# Patient Record
Sex: FEMALE | Race: ASIAN | NOT HISPANIC OR LATINO | Employment: OTHER | URBAN - METROPOLITAN AREA
[De-identification: names, ages, dates, MRNs, and addresses within clinical notes are randomized per-mention and may not be internally consistent; named-entity substitution may affect disease eponyms.]

---

## 2021-05-24 ENCOUNTER — RECORDS - HEALTHEAST (OUTPATIENT)
Dept: ADMINISTRATIVE | Facility: CLINIC | Age: 76
End: 2021-05-24

## 2022-06-25 ENCOUNTER — HOSPITAL ENCOUNTER (EMERGENCY)
Facility: CLINIC | Age: 77
Discharge: HOME | End: 2022-06-25
Attending: EMERGENCY MEDICINE | Admitting: EMERGENCY MEDICINE
Payer: COMMERCIAL

## 2022-06-25 VITALS
DIASTOLIC BLOOD PRESSURE: 71 MMHG | BODY MASS INDEX: 23.22 KG/M2 | HEIGHT: 61 IN | OXYGEN SATURATION: 100 % | WEIGHT: 123 LBS | RESPIRATION RATE: 16 BRPM | HEART RATE: 74 BPM | TEMPERATURE: 97.2 F | SYSTOLIC BLOOD PRESSURE: 164 MMHG

## 2022-06-25 DIAGNOSIS — R42 LIGHTHEADEDNESS: ICD-10-CM

## 2022-06-25 LAB
ANION GAP SERPL CALCULATED.3IONS-SCNC: 3 MMOL/L (ref 3–14)
ATRIAL RATE - MUSE: 68 BPM
BASOPHILS # BLD AUTO: 0 10E3/UL (ref 0–0.2)
BASOPHILS NFR BLD AUTO: 1 %
BUN SERPL-MCNC: 15 MG/DL (ref 7–30)
CALCIUM SERPL-MCNC: 9.3 MG/DL (ref 8.5–10.1)
CHLORIDE BLD-SCNC: 101 MMOL/L (ref 94–109)
CO2 SERPL-SCNC: 28 MMOL/L (ref 20–32)
CREAT SERPL-MCNC: 0.7 MG/DL (ref 0.52–1.04)
DIASTOLIC BLOOD PRESSURE - MUSE: NORMAL MMHG
EOSINOPHIL # BLD AUTO: 0 10E3/UL (ref 0–0.7)
EOSINOPHIL NFR BLD AUTO: 1 %
ERYTHROCYTE [DISTWIDTH] IN BLOOD BY AUTOMATED COUNT: 12.3 % (ref 10–15)
GFR SERPL CREATININE-BSD FRML MDRD: 89 ML/MIN/1.73M2
GLUCOSE BLD-MCNC: 159 MG/DL (ref 70–99)
HCT VFR BLD AUTO: 37.9 % (ref 35–47)
HGB BLD-MCNC: 12.9 G/DL (ref 11.7–15.7)
HOLD SPECIMEN: NORMAL
IMM GRANULOCYTES # BLD: 0 10E3/UL
IMM GRANULOCYTES NFR BLD: 1 %
INTERPRETATION ECG - MUSE: NORMAL
LYMPHOCYTES # BLD AUTO: 0.8 10E3/UL (ref 0.8–5.3)
LYMPHOCYTES NFR BLD AUTO: 19 %
MCH RBC QN AUTO: 30.9 PG (ref 26.5–33)
MCHC RBC AUTO-ENTMCNC: 34 G/DL (ref 31.5–36.5)
MCV RBC AUTO: 91 FL (ref 78–100)
MONOCYTES # BLD AUTO: 0.3 10E3/UL (ref 0–1.3)
MONOCYTES NFR BLD AUTO: 8 %
NEUTROPHILS # BLD AUTO: 3 10E3/UL (ref 1.6–8.3)
NEUTROPHILS NFR BLD AUTO: 70 %
NRBC # BLD AUTO: 0 10E3/UL
NRBC BLD AUTO-RTO: 0 /100
P AXIS - MUSE: 42 DEGREES
PLATELET # BLD AUTO: 240 10E3/UL (ref 150–450)
POTASSIUM BLD-SCNC: 4.4 MMOL/L (ref 3.4–5.3)
PR INTERVAL - MUSE: 194 MS
QRS DURATION - MUSE: 80 MS
QT - MUSE: 398 MS
QTC - MUSE: 423 MS
R AXIS - MUSE: 12 DEGREES
RBC # BLD AUTO: 4.18 10E6/UL (ref 3.8–5.2)
SODIUM SERPL-SCNC: 132 MMOL/L (ref 133–144)
SYSTOLIC BLOOD PRESSURE - MUSE: NORMAL MMHG
T AXIS - MUSE: 40 DEGREES
TROPONIN I SERPL HS-MCNC: 4 NG/L
TSH SERPL DL<=0.005 MIU/L-ACNC: 1.84 MU/L (ref 0.4–4)
VENTRICULAR RATE- MUSE: 68 BPM
WBC # BLD AUTO: 4.2 10E3/UL (ref 4–11)

## 2022-06-25 PROCEDURE — 99284 EMERGENCY DEPT VISIT MOD MDM: CPT | Mod: 25

## 2022-06-25 PROCEDURE — 258N000003 HC RX IP 258 OP 636: Performed by: EMERGENCY MEDICINE

## 2022-06-25 PROCEDURE — 96360 HYDRATION IV INFUSION INIT: CPT

## 2022-06-25 PROCEDURE — 80048 BASIC METABOLIC PNL TOTAL CA: CPT | Performed by: EMERGENCY MEDICINE

## 2022-06-25 PROCEDURE — 36415 COLL VENOUS BLD VENIPUNCTURE: CPT | Performed by: EMERGENCY MEDICINE

## 2022-06-25 PROCEDURE — 85025 COMPLETE CBC W/AUTO DIFF WBC: CPT | Performed by: EMERGENCY MEDICINE

## 2022-06-25 PROCEDURE — 96361 HYDRATE IV INFUSION ADD-ON: CPT

## 2022-06-25 PROCEDURE — 84443 ASSAY THYROID STIM HORMONE: CPT | Performed by: EMERGENCY MEDICINE

## 2022-06-25 PROCEDURE — 84484 ASSAY OF TROPONIN QUANT: CPT | Performed by: EMERGENCY MEDICINE

## 2022-06-25 PROCEDURE — 93005 ELECTROCARDIOGRAM TRACING: CPT

## 2022-06-25 RX ORDER — CARVEDILOL 3.12 MG/1
3.12 TABLET ORAL 2 TIMES DAILY WITH MEALS
COMMUNITY

## 2022-06-25 RX ORDER — TAMOXIFEN CITRATE 10 MG/1
10 TABLET ORAL DAILY
COMMUNITY

## 2022-06-25 RX ADMIN — SODIUM CHLORIDE 1000 ML: 9 INJECTION, SOLUTION INTRAVENOUS at 12:41

## 2022-06-25 ASSESSMENT — ENCOUNTER SYMPTOMS
VOMITING: 0
SHORTNESS OF BREATH: 0
HEADACHES: 0
DIZZINESS: 1
ABDOMINAL PAIN: 1
WEAKNESS: 1

## 2022-06-25 NOTE — ED TRIAGE NOTES
C/o abdominal pain and dizziness. Pt was out for a walk and when she came back she felt dizzy like the room was spinning. Did experience some blurred vision which has since resolved.      Triage Assessment     Row Name 06/25/22 1025       Triage Assessment (Adult)    Airway WDL WDL       Respiratory WDL    Respiratory WDL WDL       Skin Circulation/Temperature WDL    Skin Circulation/Temperature WDL WDL       Cardiac WDL    Cardiac WDL WDL       Peripheral/Neurovascular WDL    Peripheral Neurovascular WDL WDL       Cognitive/Neuro/Behavioral WDL    Cognitive/Neuro/Behavioral WDL WDL

## 2022-06-25 NOTE — ED PROVIDER NOTES
"  History   Chief Complaint:  Abdominal Pain and Dizziness       HPI   Kristina Khan is a 77 year old female who presents with weakness, dizziness, and abdominal pain since 0900 while taking a walk with several episodes of loss of vision over the course of 10 minutes. She felt close to passing out. Her vision is no longer blacking out here at the emergency department but she still feels dizziness and abdominal pain. She denies vomiting, chest pain, shortness of breath, and headaches. She eat prior to going on her walk but has not been drinking much water.     Review of Systems   Respiratory: Negative for shortness of breath.    Cardiovascular: Negative for chest pain.   Gastrointestinal: Positive for abdominal pain. Negative for vomiting.   Neurological: Positive for dizziness and weakness. Negative for headaches.   All other systems reviewed and are negative.      Allergies:  No known drug allergies     Medications:  Coreg     Past Medical History:     Hyperlipidemia   Benign tumor of adrenal gland  Breast cancer  Allergic rhinitis     Past Surgical History:    Adrenalectomy  Appendectomy    Family History:    Father: lung cancer  Mother: heart disease  Sister: breast cancer    Social History:  The patient presents to the ED with son    Physical Exam     Patient Vitals for the past 24 hrs:   BP Temp Temp src Pulse Resp SpO2 Height Weight   06/25/22 1345 (!) 173/77 -- -- 71 16 100 % -- --   06/25/22 1330 (!) 161/73 -- -- 78 13 100 % -- --   06/25/22 1315 (!) 146/70 -- -- 71 9 100 % -- --   06/25/22 1300 (!) 140/72 -- -- 71 13 100 % -- --   06/25/22 1245 (!) 157/71 -- -- 73 18 100 % -- --   06/25/22 1200 (!) 150/74 -- -- 74 -- 100 % -- --   06/25/22 1024 (!) 164/56 97.2  F (36.2  C) Temporal 76 16 100 % 1.549 m (5' 1\") 55.8 kg (123 lb)       Physical Exam  Eye:  Pupils are equal, round, and reactive.  Extraocular movements intact.    ENT:  No rhinorrhea.  Moist mucus membranes.  Normal tongue and tonsil.    Cardiac:  " Regular rate and rhythm.  No murmurs, gallops, or rubs.    Pulmonary:  Clear to auscultation bilaterally.  No wheezes, rales, or rhonchi.    Abdomen:  Positive bowel sounds.  Abdomen is soft and non-distended, without focal tenderness.    Musculoskeletal:  Normal movement of all extremities without evidence for deficit.    Skin:  Warm and dry without rashes.    Neurologic:  Cranial nerve 2-12 intact. Non-focal exam without asymmetric weakness or numbness.     Psychiatric:  Normal affect with appropriate interaction with examiner.    Emergency Department Course   ECG  ECG taken at 1246, ECG read at 1248  Normal sinus rhythm  Normal ECG   Rate 68 bpm. FL interval 194 ms. QRS duration 80 ms. QT/QTc 398/423 ms. P-R-T axes 42 12 40.     Laboratory:  Labs Ordered and Resulted from Time of ED Arrival to Time of ED Departure   BASIC METABOLIC PANEL - Abnormal       Result Value    Sodium 132 (*)     Potassium 4.4      Chloride 101      Carbon Dioxide (CO2) 28      Anion Gap 3      Urea Nitrogen 15      Creatinine 0.70      Calcium 9.3      Glucose 159 (*)     GFR Estimate 89     TROPONIN I - Normal    Troponin I High Sensitivity 4     TSH WITH FREE T4 REFLEX - Normal    TSH 1.84     CBC WITH PLATELETS AND DIFFERENTIAL    WBC Count 4.2      RBC Count 4.18      Hemoglobin 12.9      Hematocrit 37.9      MCV 91      MCH 30.9      MCHC 34.0      RDW 12.3      Platelet Count 240      % Neutrophils 70      % Lymphocytes 19      % Monocytes 8      % Eosinophils 1      % Basophils 1      % Immature Granulocytes 1      NRBCs per 100 WBC 0      Absolute Neutrophils 3.0      Absolute Lymphocytes 0.8      Absolute Monocytes 0.3      Absolute Eosinophils 0.0      Absolute Basophils 0.0      Absolute Immature Granulocytes 0.0      Absolute NRBCs 0.0          Emergency Department Course:  Reviewed:  I reviewed nursing notes, vitals, past medical history and Care Everywhere    Assessments:  1220 I obtained history and examined the patient  "as noted above.   1430 I rechecked the patient and explained findings.     Interventions:  1241 Sodium chloride bolus, 1000 ml, IV     Disposition:  The patient was discharged to home.     Impression & Plan   Medical Decision Making:  This delightful and very healthy 77-year-old woman from [facility] presents to us with lightheadedness today.  She has had several spells of lightheadedness and her family has spoken with her cardiologist about this, changing her on her medications.  She states that she always goes for a walk in the morning.  However, on her walk today, she had a similar spell of lightheadedness where she felt as though things were \"going dark\" and that she felt a cold sweat, some nausea, and clamminess.  She had a bowel movement as soon as she got home.  She denied having chest pain, shortness of breath, or other more serious features of this.    At the time of my exam, the patient is feeling completely improved.  Her vital signs are normal.  Her EKG is normal.  Laboratory investigation is unremarkable.  She received 1 L of fluid.  With this, she was able to get up and ambulate to and from the bathroom without assistance without difficulty.    I favor episodic hypotension, either due to the extreme heat we have experienced lately, poor oral intake (patient does not drink a lot of water), and exercising first thing in the morning without eating or drinking.  I feel this is unlikely to represent CHF, acute coronary syndrome, or other more serious intrathoracic causes.  Patient feels comfort the plan for discharge home.  They will continue to work with her cardiologist on her medications.  She will otherwise return to us for any worsening of condition or other emergent concerns.    Diagnosis:    ICD-10-CM    1. Lightheadedness  R42        Discharge Medications:  New Prescriptions    No medications on file       Scribe Disclosure:  I, Arun Pabon, am serving as a scribe at 12:07 PM on 6/25/2022 to " document services personally performed by Trierweiler, Chad A, MD based on my observations and the provider's statements to me.          Trierweiler, Chad A, MD  06/25/22 1959

## 2022-12-28 ENCOUNTER — APPOINTMENT (OUTPATIENT)
Dept: GENERAL RADIOLOGY | Facility: CLINIC | Age: 77
End: 2022-12-28
Attending: EMERGENCY MEDICINE
Payer: COMMERCIAL

## 2022-12-28 ENCOUNTER — HOSPITAL ENCOUNTER (EMERGENCY)
Facility: CLINIC | Age: 77
Discharge: HOME | End: 2022-12-28
Attending: EMERGENCY MEDICINE | Admitting: EMERGENCY MEDICINE
Payer: COMMERCIAL

## 2022-12-28 VITALS
BODY MASS INDEX: 22.66 KG/M2 | SYSTOLIC BLOOD PRESSURE: 145 MMHG | DIASTOLIC BLOOD PRESSURE: 68 MMHG | RESPIRATION RATE: 15 BRPM | HEART RATE: 80 BPM | TEMPERATURE: 97.8 F | OXYGEN SATURATION: 98 % | WEIGHT: 120 LBS | HEIGHT: 61 IN

## 2022-12-28 DIAGNOSIS — R07.89 ATYPICAL CHEST PAIN: ICD-10-CM

## 2022-12-28 LAB
ALBUMIN SERPL-MCNC: 3.5 G/DL (ref 3.4–5)
ALP SERPL-CCNC: 40 U/L (ref 40–150)
ALT SERPL W P-5'-P-CCNC: 24 U/L (ref 0–50)
ANION GAP SERPL CALCULATED.3IONS-SCNC: 7 MMOL/L (ref 3–14)
AST SERPL W P-5'-P-CCNC: 32 U/L (ref 0–45)
ATRIAL RATE - MUSE: 84 BPM
BASOPHILS # BLD AUTO: 0 10E3/UL (ref 0–0.2)
BASOPHILS NFR BLD AUTO: 1 %
BILIRUB SERPL-MCNC: 0.9 MG/DL (ref 0.2–1.3)
BUN SERPL-MCNC: 20 MG/DL (ref 7–30)
CALCIUM SERPL-MCNC: 9.6 MG/DL (ref 8.5–10.1)
CHLORIDE BLD-SCNC: 105 MMOL/L (ref 94–109)
CO2 SERPL-SCNC: 25 MMOL/L (ref 20–32)
CREAT SERPL-MCNC: 0.61 MG/DL (ref 0.52–1.04)
DIASTOLIC BLOOD PRESSURE - MUSE: NORMAL MMHG
EOSINOPHIL # BLD AUTO: 0.1 10E3/UL (ref 0–0.7)
EOSINOPHIL NFR BLD AUTO: 2 %
ERYTHROCYTE [DISTWIDTH] IN BLOOD BY AUTOMATED COUNT: 12 % (ref 10–15)
GFR SERPL CREATININE-BSD FRML MDRD: >90 ML/MIN/1.73M2
GLUCOSE BLD-MCNC: 129 MG/DL (ref 70–99)
HCT VFR BLD AUTO: 33.4 % (ref 35–47)
HGB BLD-MCNC: 11.4 G/DL (ref 11.7–15.7)
HOLD SPECIMEN: 0
HOLD SPECIMEN: NORMAL
HOLD SPECIMEN: NORMAL
IMM GRANULOCYTES # BLD: 0 10E3/UL
IMM GRANULOCYTES NFR BLD: 1 %
INTERPRETATION ECG - MUSE: NORMAL
LYMPHOCYTES # BLD AUTO: 1.2 10E3/UL (ref 0.8–5.3)
LYMPHOCYTES NFR BLD AUTO: 30 %
MCH RBC QN AUTO: 31.1 PG (ref 26.5–33)
MCHC RBC AUTO-ENTMCNC: 34.1 G/DL (ref 31.5–36.5)
MCV RBC AUTO: 91 FL (ref 78–100)
MONOCYTES # BLD AUTO: 0.4 10E3/UL (ref 0–1.3)
MONOCYTES NFR BLD AUTO: 11 %
NEUTROPHILS # BLD AUTO: 2.3 10E3/UL (ref 1.6–8.3)
NEUTROPHILS NFR BLD AUTO: 55 %
NRBC # BLD AUTO: 0 10E3/UL
NRBC BLD AUTO-RTO: 0 /100
P AXIS - MUSE: 36 DEGREES
PLATELET # BLD AUTO: 192 10E3/UL (ref 150–450)
POTASSIUM BLD-SCNC: 4.2 MMOL/L (ref 3.4–5.3)
PR INTERVAL - MUSE: 180 MS
PROT SERPL-MCNC: 7.3 G/DL (ref 6.8–8.8)
QRS DURATION - MUSE: 78 MS
QT - MUSE: 372 MS
QTC - MUSE: 439 MS
R AXIS - MUSE: 21 DEGREES
RBC # BLD AUTO: 3.66 10E6/UL (ref 3.8–5.2)
SODIUM SERPL-SCNC: 137 MMOL/L (ref 133–144)
SYSTOLIC BLOOD PRESSURE - MUSE: NORMAL MMHG
T AXIS - MUSE: 40 DEGREES
TROPONIN I SERPL HS-MCNC: 6 NG/L
VENTRICULAR RATE- MUSE: 84 BPM
WBC # BLD AUTO: 4.1 10E3/UL (ref 4–11)

## 2022-12-28 PROCEDURE — 84484 ASSAY OF TROPONIN QUANT: CPT | Performed by: EMERGENCY MEDICINE

## 2022-12-28 PROCEDURE — 99285 EMERGENCY DEPT VISIT HI MDM: CPT | Mod: 25

## 2022-12-28 PROCEDURE — 80053 COMPREHEN METABOLIC PANEL: CPT | Performed by: EMERGENCY MEDICINE

## 2022-12-28 PROCEDURE — 93005 ELECTROCARDIOGRAM TRACING: CPT

## 2022-12-28 PROCEDURE — 85004 AUTOMATED DIFF WBC COUNT: CPT | Performed by: EMERGENCY MEDICINE

## 2022-12-28 PROCEDURE — 36415 COLL VENOUS BLD VENIPUNCTURE: CPT | Performed by: EMERGENCY MEDICINE

## 2022-12-28 PROCEDURE — 71046 X-RAY EXAM CHEST 2 VIEWS: CPT

## 2022-12-28 ASSESSMENT — ACTIVITIES OF DAILY LIVING (ADL)
ADLS_ACUITY_SCORE: 35
ADLS_ACUITY_SCORE: 35

## 2022-12-28 NOTE — ED TRIAGE NOTES
"Pt presents with chest heaviness that began last night. Pt reports last night she did not feel great, had little appetite and then did not sleep well during the night due to the chest heaviness. Pt reports \"I feel like my heart isn't working right.\" Pt reports 10 days ago she had an angiogram and was started on new cardiac medications and that is when she started to not feel well. Pt denies SOB      Triage Assessment     Row Name 12/28/22 0719       Triage Assessment (Adult)    Airway WDL WDL       Respiratory WDL    Respiratory WDL WDL       Skin Circulation/Temperature WDL    Skin Circulation/Temperature WDL WDL       Cardiac WDL    Cardiac WDL X;all  Chest pressure, reports began last night and still present today. Pt reports chest feels heavy       Chest Pain Assessment    Chest Pain Intervention cardiac monitor placed;12-lead ECG obtained       Peripheral/Neurovascular WDL    Peripheral Neurovascular WDL WDL       Cognitive/Neuro/Behavioral WDL    Cognitive/Neuro/Behavioral WDL WDL              "

## 2022-12-28 NOTE — ED PROVIDER NOTES
Visit Date: 12/28/2022    CHIEF COMPLAINT:  Chest pain and shakiness.    HISTORY OF PRESENT ILLNESS:  This is a 77-year-old woman who comes to the ED with her son with the above complaints.  The patient underwent a catheterization within the past days and found to have no lesions requiring stents, but moderate coronary disease.  It was thought to best treat her medically.  She had Norvasc and Imdur added to her medications after the catheterization.  Prior to the catheterization by a few weeks, she had had Crestor added.  She says since her catheterization, she has had a vague discomfort, which has been there now for over 7 days, and she has had this tremulous, which is getting worse.  She denies shortness of breath, fever, sore throat or cough, diarrhea or any urinary symptoms.  She presents with her son who corroborates her information.  Both the patient and her son thinks this may be related to some medication she is taking.    PAST MEDICAL HISTORY:  Significant for breast cancer, appendectomy and left adrenalectomy.    MEDICATIONS:  Include the above-mentioned, as well as Coreg and tamoxifen.    ALLERGIES:  NONE LISTED.    FAMILY AND SOCIAL HISTORY:  She does not smoke or drink.    REVIEW OF SYSTEMS:  As noted above.  All other systems reviewed and negative.    PHYSICAL EXAMINATION:    GENERAL:  Reveals a middle-aged,  female, supine, no respiratory distress.  VITAL SIGNS:  Blood pressure 158/75 on recheck 145/68, temperature 97, pulse 82, respirations 14, pulse ox 96% on room air, weight 54 kg.  HEENT:  Pupils equal, reactive.  Extraocular movements intact.  Oropharynx clear.  NECK:  Neck veins flat.  LUNGS:  Clear.  HEART:  Regular, no murmur, rubs, or gallops.  CHEST WALL:  Nontender.  ABDOMEN:  Soft, without tenderness or masses.  MUSCULOSKELETAL:  No swelling or tenderness.  SKIN:  No rash.  NEUROLOGIC:  Awake, alert, appropriate.  Fluent speech.  Normal motor sensation, coordination.  PSYCHIATRIC:   Calm affect.    EMERGENCY DEPARTMENT COURSE:  The patient was placed on monitors.  EKG was obtained.  This reveals a normal sinus rhythm with nonspecific ST-T changes.  I do not have an old EKG to compare this to.  The rate is 84, , QRS 78 and QTc is 439.      LABS:  White count 4.1, hemoglobin 11.4, platelets 192.  Chemistries:  Glucose 129.  Otherwise normal.     Chest x-ray shows no acute infiltrates or failure.    After the patient's workup and with normal troponin after 5-6 days of symptoms, I thought maybe this is a medication reaction.  I think this is unlikely from the Imdur, but could be related to the Norvasc.  We will hold that for the time being, her blood pressure seems to be relatively stable.  I have told the patient and son that it is very important for them to follow up with cardiology and that they should make an appointment to follow up within the next week.  If symptoms change or worsen in the meantime, recheck in the ED.  I do not think the patient has had a PE, nor dissection, or has ongoing acute coronary disease at this time.    IMPRESSION:  Atypical chest pain.    PLAN:  As noted.    Jp Elliott MD        D: 2022   T: 2022   MT: HARJINDER    Name:     THUAN SANCHEZ  MRN:      [id number]        Account:    [id number]   :      1945           Visit Date: 2022     Document: [id number]

## 2025-05-07 ENCOUNTER — HOSPITAL ENCOUNTER (INPATIENT)
Facility: CLINIC | Age: 80
LOS: 1 days | Discharge: HOME | End: 2025-05-08
Attending: EMERGENCY MEDICINE | Admitting: INTERNAL MEDICINE
Payer: COMMERCIAL

## 2025-05-07 ENCOUNTER — APPOINTMENT (OUTPATIENT)
Dept: GENERAL RADIOLOGY | Facility: CLINIC | Age: 80
DRG: 244 | End: 2025-05-07
Attending: EMERGENCY MEDICINE
Payer: COMMERCIAL

## 2025-05-07 DIAGNOSIS — Z86.79 HISTORY OF CORONARY ARTERY DISEASE: ICD-10-CM

## 2025-05-07 DIAGNOSIS — I49.5 SINUS NODE DYSFUNCTION (H): Primary | ICD-10-CM

## 2025-05-07 DIAGNOSIS — R42 LIGHTHEADEDNESS: ICD-10-CM

## 2025-05-07 DIAGNOSIS — I45.5 SINUS PAUSE: ICD-10-CM

## 2025-05-07 PROBLEM — R55 NEAR SYNCOPE: Status: ACTIVE | Noted: 2025-05-07

## 2025-05-07 LAB
ALBUMIN SERPL BCG-MCNC: 4.3 G/DL (ref 3.5–5.2)
ALP SERPL-CCNC: 96 U/L (ref 40–150)
ALT SERPL W P-5'-P-CCNC: 33 U/L (ref 0–50)
ANION GAP SERPL CALCULATED.3IONS-SCNC: 13 MMOL/L (ref 7–15)
AST SERPL W P-5'-P-CCNC: 39 U/L (ref 0–45)
ATRIAL RATE - MUSE: 76 BPM
BASOPHILS # BLD AUTO: 0 10E3/UL (ref 0–0.2)
BASOPHILS NFR BLD AUTO: 1 %
BILIRUB SERPL-MCNC: 0.5 MG/DL
BUN SERPL-MCNC: 24.3 MG/DL (ref 8–23)
CA-I BLD-MCNC: 5.1 MG/DL (ref 4.4–5.2)
CALCIUM SERPL-MCNC: 10.7 MG/DL (ref 8.8–10.4)
CHLORIDE SERPL-SCNC: 99 MMOL/L (ref 98–107)
CREAT SERPL-MCNC: 0.83 MG/DL (ref 0.51–0.95)
DIASTOLIC BLOOD PRESSURE - MUSE: NORMAL MMHG
EGFRCR SERPLBLD CKD-EPI 2021: 71 ML/MIN/1.73M2
EOSINOPHIL # BLD AUTO: 0.1 10E3/UL (ref 0–0.7)
EOSINOPHIL NFR BLD AUTO: 3 %
ERYTHROCYTE [DISTWIDTH] IN BLOOD BY AUTOMATED COUNT: 13 % (ref 10–15)
GLUCOSE SERPL-MCNC: 256 MG/DL (ref 70–99)
HCO3 SERPL-SCNC: 23 MMOL/L (ref 22–29)
HCT VFR BLD AUTO: 39.5 % (ref 35–47)
HGB BLD-MCNC: 13 G/DL (ref 11.7–15.7)
HOLD SPECIMEN: NORMAL
IMM GRANULOCYTES # BLD: 0 10E3/UL
IMM GRANULOCYTES NFR BLD: 0 %
INTERPRETATION ECG - MUSE: NORMAL
LYMPHOCYTES # BLD AUTO: 1.7 10E3/UL (ref 0.8–5.3)
LYMPHOCYTES NFR BLD AUTO: 39 %
MCH RBC QN AUTO: 29.5 PG (ref 26.5–33)
MCHC RBC AUTO-ENTMCNC: 32.9 G/DL (ref 31.5–36.5)
MCV RBC AUTO: 90 FL (ref 78–100)
MONOCYTES # BLD AUTO: 0.5 10E3/UL (ref 0–1.3)
MONOCYTES NFR BLD AUTO: 11 %
NEUTROPHILS # BLD AUTO: 2.1 10E3/UL (ref 1.6–8.3)
NEUTROPHILS NFR BLD AUTO: 47 %
NRBC # BLD AUTO: 0 10E3/UL
NRBC BLD AUTO-RTO: 0 /100
P AXIS - MUSE: 35 DEGREES
PLATELET # BLD AUTO: 207 10E3/UL (ref 150–450)
POTASSIUM SERPL-SCNC: 4.3 MMOL/L (ref 3.4–5.3)
PR INTERVAL - MUSE: 164 MS
PROT SERPL-MCNC: 7.9 G/DL (ref 6.4–8.3)
QRS DURATION - MUSE: 84 MS
QT - MUSE: 382 MS
QTC - MUSE: 429 MS
R AXIS - MUSE: 39 DEGREES
RBC # BLD AUTO: 4.4 10E6/UL (ref 3.8–5.2)
SODIUM SERPL-SCNC: 135 MMOL/L (ref 135–145)
SYSTOLIC BLOOD PRESSURE - MUSE: NORMAL MMHG
T AXIS - MUSE: 54 DEGREES
TROPONIN T SERPL HS-MCNC: 8 NG/L
TROPONIN T SERPL HS-MCNC: <6 NG/L
VENTRICULAR RATE- MUSE: 76 BPM
WBC # BLD AUTO: 4.5 10E3/UL (ref 4–11)

## 2025-05-07 PROCEDURE — 99153 MOD SED SAME PHYS/QHP EA: CPT | Performed by: INTERNAL MEDICINE

## 2025-05-07 PROCEDURE — 33208 INSRT HEART PM ATRIAL & VENT: CPT | Performed by: INTERNAL MEDICINE

## 2025-05-07 PROCEDURE — 250N000009 HC RX 250: Mod: JW | Performed by: INTERNAL MEDICINE

## 2025-05-07 PROCEDURE — 272N000001 HC OR GENERAL SUPPLY STERILE: Performed by: INTERNAL MEDICINE

## 2025-05-07 PROCEDURE — 36415 COLL VENOUS BLD VENIPUNCTURE: CPT | Performed by: EMERGENCY MEDICINE

## 2025-05-07 PROCEDURE — 82330 ASSAY OF CALCIUM: CPT | Performed by: INTERNAL MEDICINE

## 2025-05-07 PROCEDURE — 250N000011 HC RX IP 250 OP 636: Performed by: INTERNAL MEDICINE

## 2025-05-07 PROCEDURE — 85025 COMPLETE CBC W/AUTO DIFF WBC: CPT | Performed by: EMERGENCY MEDICINE

## 2025-05-07 PROCEDURE — 99285 EMERGENCY DEPT VISIT HI MDM: CPT | Mod: 25

## 2025-05-07 PROCEDURE — 99152 MOD SED SAME PHYS/QHP 5/>YRS: CPT | Performed by: INTERNAL MEDICINE

## 2025-05-07 PROCEDURE — C1894 INTRO/SHEATH, NON-LASER: HCPCS | Performed by: INTERNAL MEDICINE

## 2025-05-07 PROCEDURE — 0JH606Z INSERTION OF PACEMAKER, DUAL CHAMBER INTO CHEST SUBCUTANEOUS TISSUE AND FASCIA, OPEN APPROACH: ICD-10-PCS | Performed by: INTERNAL MEDICINE

## 2025-05-07 PROCEDURE — 99418 PROLNG IP/OBS E/M EA 15 MIN: CPT | Performed by: INTERNAL MEDICINE

## 2025-05-07 PROCEDURE — 99223 1ST HOSP IP/OBS HIGH 75: CPT | Performed by: INTERNAL MEDICINE

## 2025-05-07 PROCEDURE — 33208 INSRT HEART PM ATRIAL & VENT: CPT | Mod: KX | Performed by: INTERNAL MEDICINE

## 2025-05-07 PROCEDURE — 84450 TRANSFERASE (AST) (SGOT): CPT | Performed by: EMERGENCY MEDICINE

## 2025-05-07 PROCEDURE — C1887 CATHETER, GUIDING: HCPCS | Performed by: INTERNAL MEDICINE

## 2025-05-07 PROCEDURE — 99223 1ST HOSP IP/OBS HIGH 75: CPT | Mod: 57 | Performed by: INTERNAL MEDICINE

## 2025-05-07 PROCEDURE — 84484 ASSAY OF TROPONIN QUANT: CPT | Performed by: INTERNAL MEDICINE

## 2025-05-07 PROCEDURE — 93005 ELECTROCARDIOGRAM TRACING: CPT

## 2025-05-07 PROCEDURE — 84484 ASSAY OF TROPONIN QUANT: CPT | Performed by: EMERGENCY MEDICINE

## 2025-05-07 PROCEDURE — 36415 COLL VENOUS BLD VENIPUNCTURE: CPT | Performed by: INTERNAL MEDICINE

## 2025-05-07 PROCEDURE — 210N000002 HC R&B HEART CARE

## 2025-05-07 PROCEDURE — 02H63JZ INSERTION OF PACEMAKER LEAD INTO RIGHT ATRIUM, PERCUTANEOUS APPROACH: ICD-10-PCS | Performed by: INTERNAL MEDICINE

## 2025-05-07 PROCEDURE — 02HK3JZ INSERTION OF PACEMAKER LEAD INTO RIGHT VENTRICLE, PERCUTANEOUS APPROACH: ICD-10-PCS | Performed by: INTERNAL MEDICINE

## 2025-05-07 PROCEDURE — C1898 LEAD, PMKR, OTHER THAN TRANS: HCPCS | Performed by: INTERNAL MEDICINE

## 2025-05-07 PROCEDURE — 99207 PR APP CREDIT; MD BILLING SHARED VISIT: CPT | Performed by: INTERNAL MEDICINE

## 2025-05-07 PROCEDURE — 250N000013 HC RX MED GY IP 250 OP 250 PS 637: Performed by: INTERNAL MEDICINE

## 2025-05-07 PROCEDURE — C1785 PMKR, DUAL, RATE-RESP: HCPCS | Performed by: INTERNAL MEDICINE

## 2025-05-07 PROCEDURE — 258N000003 HC RX IP 258 OP 636: Performed by: INTERNAL MEDICINE

## 2025-05-07 PROCEDURE — 71046 X-RAY EXAM CHEST 2 VIEWS: CPT

## 2025-05-07 DEVICE — IMP LEAD PACING BIPOLAR CAPSUREFIX NOVUS 45CM 5076-45: Type: IMPLANTABLE DEVICE | Status: FUNCTIONAL

## 2025-05-07 DEVICE — LEAD PACEMAKER SELECTSECURE 69CM MD  383069: Type: IMPLANTABLE DEVICE | Status: FUNCTIONAL

## 2025-05-07 DEVICE — PACEMAKER AZURE MRI XT DR: Type: IMPLANTABLE DEVICE | Status: FUNCTIONAL

## 2025-05-07 RX ORDER — ISOSORBIDE MONONITRATE 30 MG/1
30 TABLET, EXTENDED RELEASE ORAL DAILY
Status: DISCONTINUED | OUTPATIENT
Start: 2025-05-08 | End: 2025-05-08 | Stop reason: HOSPADM

## 2025-05-07 RX ORDER — FENTANYL CITRATE 50 UG/ML
INJECTION, SOLUTION INTRAMUSCULAR; INTRAVENOUS
Status: DISCONTINUED | OUTPATIENT
Start: 2025-05-07 | End: 2025-05-07 | Stop reason: HOSPADM

## 2025-05-07 RX ORDER — CEFAZOLIN SODIUM 2 G/50ML
2 SOLUTION INTRAVENOUS
Status: COMPLETED | OUTPATIENT
Start: 2025-05-07 | End: 2025-05-07

## 2025-05-07 RX ORDER — METOPROLOL SUCCINATE 50 MG/1
50 TABLET, EXTENDED RELEASE ORAL DAILY
COMMUNITY

## 2025-05-07 RX ORDER — ACETAMINOPHEN 325 MG/1
650 TABLET ORAL EVERY 4 HOURS PRN
Status: DISCONTINUED | OUTPATIENT
Start: 2025-05-07 | End: 2025-05-08 | Stop reason: HOSPADM

## 2025-05-07 RX ORDER — ONDANSETRON 4 MG/1
4 TABLET, ORALLY DISINTEGRATING ORAL EVERY 6 HOURS PRN
Status: DISCONTINUED | OUTPATIENT
Start: 2025-05-07 | End: 2025-05-08 | Stop reason: HOSPADM

## 2025-05-07 RX ORDER — ROSUVASTATIN CALCIUM 20 MG/1
20 TABLET, COATED ORAL EVERY EVENING
Status: DISCONTINUED | OUTPATIENT
Start: 2025-05-07 | End: 2025-05-08 | Stop reason: HOSPADM

## 2025-05-07 RX ORDER — SODIUM CHLORIDE 450 MG/100ML
INJECTION, SOLUTION INTRAVENOUS CONTINUOUS
Status: DISCONTINUED | OUTPATIENT
Start: 2025-05-07 | End: 2025-05-07 | Stop reason: HOSPADM

## 2025-05-07 RX ORDER — ACETAMINOPHEN 325 MG/1
650 TABLET ORAL 2 TIMES DAILY
Status: DISCONTINUED | OUTPATIENT
Start: 2025-05-07 | End: 2025-05-08 | Stop reason: HOSPADM

## 2025-05-07 RX ORDER — THERA TABS 400 MCG
1 TAB ORAL DAILY
COMMUNITY

## 2025-05-07 RX ORDER — LISINOPRIL 10 MG/1
10 TABLET ORAL DAILY
Status: DISCONTINUED | OUTPATIENT
Start: 2025-05-08 | End: 2025-05-08 | Stop reason: HOSPADM

## 2025-05-07 RX ORDER — AMOXICILLIN 250 MG
1 CAPSULE ORAL 2 TIMES DAILY PRN
Status: DISCONTINUED | OUTPATIENT
Start: 2025-05-07 | End: 2025-05-08 | Stop reason: HOSPADM

## 2025-05-07 RX ORDER — CALCIUM CARBONATE 500 MG/1
1000 TABLET, CHEWABLE ORAL 4 TIMES DAILY PRN
Status: DISCONTINUED | OUTPATIENT
Start: 2025-05-07 | End: 2025-05-08 | Stop reason: HOSPADM

## 2025-05-07 RX ORDER — ASPIRIN 81 MG/1
81 TABLET ORAL DAILY
Status: DISCONTINUED | OUTPATIENT
Start: 2025-05-08 | End: 2025-05-08 | Stop reason: HOSPADM

## 2025-05-07 RX ORDER — LISINOPRIL 10 MG/1
10 TABLET ORAL DAILY
COMMUNITY

## 2025-05-07 RX ORDER — ROSUVASTATIN CALCIUM 20 MG/1
20 TABLET, COATED ORAL EVERY EVENING
COMMUNITY

## 2025-05-07 RX ORDER — CEFAZOLIN SODIUM 2 G/100ML
INJECTION, SOLUTION INTRAVENOUS CONTINUOUS PRN
Status: COMPLETED | OUTPATIENT
Start: 2025-05-07 | End: 2025-05-07

## 2025-05-07 RX ORDER — ASPIRIN 81 MG/1
81 TABLET ORAL DAILY
COMMUNITY

## 2025-05-07 RX ORDER — SODIUM CHLORIDE 9 MG/ML
INJECTION, SOLUTION INTRAVENOUS CONTINUOUS
Status: DISCONTINUED | OUTPATIENT
Start: 2025-05-07 | End: 2025-05-08 | Stop reason: HOSPADM

## 2025-05-07 RX ORDER — LIDOCAINE 40 MG/G
CREAM TOPICAL
Status: DISCONTINUED | OUTPATIENT
Start: 2025-05-07 | End: 2025-05-07

## 2025-05-07 RX ORDER — AMOXICILLIN 250 MG
2 CAPSULE ORAL 2 TIMES DAILY PRN
Status: DISCONTINUED | OUTPATIENT
Start: 2025-05-07 | End: 2025-05-08 | Stop reason: HOSPADM

## 2025-05-07 RX ORDER — ISOSORBIDE MONONITRATE 30 MG/1
30 TABLET, EXTENDED RELEASE ORAL DAILY
COMMUNITY

## 2025-05-07 RX ORDER — ACETAMINOPHEN 650 MG/1
650 SUPPOSITORY RECTAL EVERY 4 HOURS PRN
Status: DISCONTINUED | OUTPATIENT
Start: 2025-05-07 | End: 2025-05-08 | Stop reason: HOSPADM

## 2025-05-07 RX ORDER — LIDOCAINE 40 MG/G
CREAM TOPICAL
Status: DISCONTINUED | OUTPATIENT
Start: 2025-05-07 | End: 2025-05-08 | Stop reason: HOSPADM

## 2025-05-07 RX ORDER — METOPROLOL SUCCINATE 50 MG/1
50 TABLET, EXTENDED RELEASE ORAL DAILY
Status: DISCONTINUED | OUTPATIENT
Start: 2025-05-08 | End: 2025-05-08 | Stop reason: HOSPADM

## 2025-05-07 RX ORDER — ONDANSETRON 2 MG/ML
4 INJECTION INTRAMUSCULAR; INTRAVENOUS EVERY 6 HOURS PRN
Status: DISCONTINUED | OUTPATIENT
Start: 2025-05-07 | End: 2025-05-08 | Stop reason: HOSPADM

## 2025-05-07 RX ADMIN — CEFAZOLIN SODIUM 2 G: 2 SOLUTION INTRAVENOUS at 15:35

## 2025-05-07 RX ADMIN — ROSUVASTATIN CALCIUM 20 MG: 20 TABLET, FILM COATED ORAL at 23:34

## 2025-05-07 RX ADMIN — SODIUM CHLORIDE: 0.9 INJECTION, SOLUTION INTRAVENOUS at 07:42

## 2025-05-07 RX ADMIN — ACETAMINOPHEN 650 MG: 325 TABLET, FILM COATED ORAL at 19:22

## 2025-05-07 ASSESSMENT — ACTIVITIES OF DAILY LIVING (ADL)
ADLS_ACUITY_SCORE: 41
ADLS_ACUITY_SCORE: 41
ADLS_ACUITY_SCORE: 30
ADLS_ACUITY_SCORE: 19
ADLS_ACUITY_SCORE: 41
ADLS_ACUITY_SCORE: 41
ADLS_ACUITY_SCORE: 19
ADLS_ACUITY_SCORE: 19
ADLS_ACUITY_SCORE: 41
ADLS_ACUITY_SCORE: 19
ADLS_ACUITY_SCORE: 30
ADLS_ACUITY_SCORE: 19
ADLS_ACUITY_SCORE: 41
ADLS_ACUITY_SCORE: 19
ADLS_ACUITY_SCORE: 41
ADLS_ACUITY_SCORE: 19
ADLS_ACUITY_SCORE: 41
ADLS_ACUITY_SCORE: 30
ADLS_ACUITY_SCORE: 19

## 2025-05-07 NOTE — ED NOTES
"[facility]  ED Nurse Handoff Report    ED Chief complaint: Chest Pain (Awakened from sleep with chest pressure.  EMS EKG showing second degree block (new for pt)   -190 received 324 asa and 0.4 nitroglycerin with pain relief.  )      ED Diagnosis:   Final diagnoses:   Sinus pause   Lightheadedness   History of coronary artery disease       Code Status: Full Code    Allergies:   Allergies   Allergen Reactions    Tylenol-Codeine #3 [Acetaminophen-Codeine] Other (See Comments)     Cold all over       Patient Story:  80 year old female with history of hypertension who presents to the ED via EMS for evaluation of chest pain. She reports waking up at 0130 with chest pressure, cold sweats, shakes, and shortness of breath. She states \"it feels like a rock is sitting on my chest\". Denies abdominal pain. Recent travel to [place]. EMS administered 0.4 nitroglycerin which provided pain relief.     Focused Assessment:  CP    Treatments and/or interventions provided: labs, imaging  Patient's response to treatments and/or interventions: Pt CP improved after nitroglycerine given by EMS  Results for orders placed or performed during the hospital encounter of 05/07/25   XR Chest 2 Views     Status: None    Narrative    EXAM: XR CHEST 2 VIEWS  LOCATION: [facility]  DATE: 5/7/2025    INDICATION: Chest pain.  COMPARISON: Chest x-ray 2 views 2/16/2019 at 1311 hours.      Impression    IMPRESSION: Both lungs remain clear. No adenopathy or effusion. Normal cardiac size and pulmonary vascularity. Atherosclerotic thoracic aorta. Mild degenerative changes both shoulders and the spine. Monitoring leads have been placed overlying the chest.   Comprehensive metabolic panel     Status: Abnormal   Result Value Ref Range    Sodium 135 135 - 145 mmol/L    Potassium 4.3 3.4 - 5.3 mmol/L    Carbon Dioxide (CO2) 23 22 - 29 mmol/L    Anion Gap 13 7 - 15 mmol/L    Urea Nitrogen 24.3 (H) 8.0 - 23.0 " mg/dL    Creatinine 0.83 0.51 - 0.95 mg/dL    GFR Estimate 71 >60 mL/min/1.73m2    Calcium 10.7 (H) 8.8 - 10.4 mg/dL    Chloride 99 98 - 107 mmol/L    Glucose 256 (H) 70 - 99 mg/dL    Alkaline Phosphatase 96 40 - 150 U/L    AST 39 0 - 45 U/L    ALT 33 0 - 50 U/L    Protein Total 7.9 6.4 - 8.3 g/dL    Albumin 4.3 3.5 - 5.2 g/dL    Bilirubin Total 0.5 <=1.2 mg/dL   Troponin T, High Sensitivity     Status: Normal   Result Value Ref Range    Troponin T, High Sensitivity <6 <=14 ng/L   CBC with platelets and differential     Status: None   Result Value Ref Range    WBC Count 4.5 4.0 - 11.0 10e3/uL    RBC Count 4.40 3.80 - 5.20 10e6/uL    Hemoglobin 13.0 11.7 - 15.7 g/dL    Hematocrit 39.5 35.0 - 47.0 %    MCV 90 78 - 100 fL    MCH 29.5 26.5 - 33.0 pg    MCHC 32.9 31.5 - 36.5 g/dL    RDW 13.0 10.0 - 15.0 %    Platelet Count 207 150 - 450 10e3/uL    % Neutrophils 47 %    % Lymphocytes 39 %    % Monocytes 11 %    % Eosinophils 3 %    % Basophils 1 %    % Immature Granulocytes 0 %    NRBCs per 100 WBC 0 <1 /100    Absolute Neutrophils 2.1 1.6 - 8.3 10e3/uL    Absolute Lymphocytes 1.7 0.8 - 5.3 10e3/uL    Absolute Monocytes 0.5 0.0 - 1.3 10e3/uL    Absolute Eosinophils 0.1 0.0 - 0.7 10e3/uL    Absolute Basophils 0.0 0.0 - 0.2 10e3/uL    Absolute Immature Granulocytes 0.0 <=0.4 10e3/uL    Absolute NRBCs 0.0 10e3/uL   [place] Draw     Status: None    Narrative    The following orders were created for panel order [place] Draw.  Procedure                               Abnormality         Status                     ---------                               -----------         ------                     Extra Blue Top Tube[[id number]]                             Final result                 Please view results for these tests on the individual orders.   Extra Blue Top Tube     Status: None   Result Value Ref Range    Hold Specimen JIC    EKG 12-lead, tracing only     Status: None   Result Value Ref Range    Systolic Blood Pressure   "mmHg    Diastolic Blood Pressure  mmHg    Ventricular Rate 76 BPM    Atrial Rate 76 BPM    MI Interval 164 ms    QRS Duration 84 ms     ms    QTc 429 ms    P Axis 35 degrees    R AXIS 39 degrees    T Axis 54 degrees    Interpretation ECG       Sinus rhythm with marked sinus arrhythmia  Otherwise normal ECG  When compared with ECG of 07-May-2025 02:46, (unconfirmed)  No significant change was found  Confirmed by GENERATED REPORT, COMPUTER ([id number]),  Irma Corey ([id number]) on 5/7/2025 3:04:47 AM     CBC with platelets differential     Status: None    Narrative    The following orders were created for panel order CBC with platelets differential.  Procedure                               Abnormality         Status                     ---------                               -----------         ------                     CBC with platelets and ...[[id number]]                      Final result                 Please view results for these tests on the individual orders.       To be done/followed up on inpatient unit:  IP orders    Does this patient have any cognitive concerns?: none    Activity level - Baseline/Home:  Independent  Activity Level - Current:   Independent    Patient's Preferred language: English   Needed?: No    Isolation: None  Infection: Not Applicable  Patient tested for COVID 19 prior to admission: YES  Bariatric?: No    Vital Signs:   Vitals:    05/07/25 0251 05/07/25 0300   BP: (!) 160/85 (!) 146/83   Pulse: 79 67   Resp: 18 10   Temp: 97.9  F (36.6  C)    TempSrc: Oral    SpO2: 98% 98%   Weight: 56.7 kg (125 lb)    Height: 1.549 m (5' 1\")        Cardiac Rhythm:     Was the PSS-3 completed:   Yes  What interventions are required if any?               Family Comments: Children here  OBS brochure/video discussed/provided to patient/family: Yes              Name of person given brochure if not patient:               Relationship to patient:     For the majority of the shift this " patient's behavior was Green.   Behavioral interventions performed were  none.    ED NURSE PHONE NUMBER: *[phone]

## 2025-05-07 NOTE — ED NOTES
Assisted patient to the bedside commode. Patient was able to pass urine and stool without difficulty. Patient returned to [facility] and is resting supine. Call light within reach.

## 2025-05-07 NOTE — PHARMACY-ADMISSION MEDICATION HISTORY
Pharmacist Admission Medication History    Admission medication history is complete. The information provided in this note is only as accurate as the sources available at the time of the update.    Information Source(s): Patient and CareEverywhere/SureScripts via in-person    Pertinent Information: Patient hasn't been taking lisinopril to see if it would resolve her dizziness.     Changes made to PTA medication list:  Added: all  Deleted: coreg, tamoxifen  Changed: None    Allergies reviewed with patient and updates made in EHR: no    Medication History Completed By: Eloisa Rivera RPH 5/7/2025 8:24 AM    PTA Med List   Medication Sig Last Dose/Taking    aspirin 81 MG EC tablet Take 81 mg by mouth daily. 5/6/2025 Morning    isosorbide mononitrate (IMDUR) 30 MG 24 hr tablet Take 30 mg by mouth daily. 5/6/2025 Morning    lisinopril (ZESTRIL) 10 MG tablet Take 10 mg by mouth daily. Past Week    metoprolol succinate ER (TOPROL XL) 50 MG 24 hr tablet Take 50 mg by mouth daily. 5/6/2025 Morning    multivitamin, therapeutic (THERA-VIT) TABS tablet Take 1 tablet by mouth daily. 5/6/2025 Morning    rosuvastatin (CRESTOR) 20 MG tablet Take 20 mg by mouth every evening. 5/6/2025 Evening

## 2025-05-07 NOTE — Clinical Note
Potential access sites were evaluated for patency using ultrasound.   . Access was obtained under with Sonosite guidance using a micropuncture 21 gauge needle with direct visualization of needle entry.

## 2025-05-07 NOTE — PROGRESS NOTES
Pt arrived from ED, A/O and vitals stable on RA. Ambulated to bed. Denies pain. EP consulted in ED and Planing PPM today around 4pm. Continue to monitor.

## 2025-05-07 NOTE — PROGRESS NOTES
[phone]  Aox4, calm cooperative. VSS on RA. Sinus dysrhythmia on tele. IVF infusing per order. SBA to bedside commode. NPO pending PPM placement- possibly later today.

## 2025-05-07 NOTE — H&P
"[facility]    History and Physical - Hospitalist Service       Date of Admission:  5/7/2025    Assessment & Plan      Kristina Khan is a 80 year old female admitted on 5/7/2025. She presents with chest pain    Sick sinus syndrome, possible  CAD  HTN  HLD  [Needs rec- lisinopril 10 mg daily, imdur 30 mg daily, metoprolol 25 mg daily, rosuvastatin 20 mg daily]  *Hospitalized 12/2022 w/ angina. Angiogram LAD 40% prox stenosis, occl OM2, cx 40%, other findings  *saw cards 4/7 and was c/o dizziness. Wore ziopatch, had 5 pauses w/ longest up to 15 seconds. They decreased metoprolol 50->25 mg. Has intermittent CP/SOB w/ exertion over the last couple of years  *notes intermittent episodes of lightheadedness, vision going black. Presents w/ onset 0130 of CP, sweats, shakes and SOB. \"Felt like a rock sitting on chest\". NTG appeared to help. In ED AFVSS. Troponin negative. CXR clear. EKG w/ sinus pauses, telemetry from EMS also shows sinus pauses  - telemetry   - EP consult  - repeat troponin  - echo  - stop metoprolol  - resume other meds with rec    Hypercalcemia  Ca sl elevated at 10.7.   - check ionized    Hx breast cancer  S/p lumpectomy and XRT.           Diet:  NPO, IV fluids  DVT Prophylaxis: Pneumatic Compression Devices  Lane Catheter: Not present  Lines: None     Cardiac Monitoring: None  Code Status:  Full    Clinically Significant Risk Factors Present on Admission            # Hypercalcemia: Highest Ca = 10.7 mg/dL in last 2 days, will monitor as appropriate                             Disposition Plan     Medically Ready for Discharge: Anticipated in 2-4 Days           Luís Guerrero MD  Hospitalist Service  [facility]  Securely message with [employer] (more info)  Text page via [employer] Paging/Directory     ______________________________________________________________________    Chief Complaint   Chest pain, diaphoresis, lightheadedness    History is obtained from " "the patient, electronic health record, and emergency department physician    History of Present Illness   Kristina Khan is a 80 year old female who presents with chest pain, diaphoresis, lightheadedness and shortness of breath.  She has a history of coronary disease.  In late 2022 she was complaining of angina.  She underwent an angiogram and she did have 100% obtuse marginal occlusion as well as other disease.  Decision was made not to stent and medically manage.  She does report since then that she does have intermittent episodes of chest pain with exertion that improves with rest.  She still does have a decent exercise tolerance and does go for walks.  More recently she has noted episodes where her vision will go dark and she will get lightheaded.  She is not sure how long this has been going on and sometimes it will happen relatively frequently and then sometimes it will be months before it happens.  She has never lost consciousness.  She reports once being in the shower needing to grab the bar so she did not fall.  This morning at about 1:30 AM she woke from sleep and she was diaphoretic, clammy as well as had chest pain.  She was complaining of shortness of breath.  She stated \"it felt like a rock was sitting on my chest \".  She was given nitro and that seemed to help.  In the emergency department she is doing well does not have any complaints.      Past Medical History    Past Medical History:   Diagnosis Date    Breast cancer (H)     CAD (coronary artery disease)     HLD (hyperlipidemia)     HTN (hypertension)        Past Surgical History   Past Surgical History:   Procedure Laterality Date    ADRENALECTOMY Left     tumor    APPENDECTOMY         Prior to Admission Medications   Prior to Admission Medications   Prescriptions Last Dose Informant Patient Reported? Taking?   carvedilol (COREG) 3.125 MG tablet   Yes No   Sig: Take 3.125 mg by mouth 2 times daily (with meals)   multivitamin therapeutic (THERAGRAN) " tablet   Yes No   Sig: [MULTIVITAMIN THERAPEUTIC (THERAGRAN) TABLET] Take 1 tablet by mouth daily as needed (when food lacks sufficient nutrients).   tamoxifen (NOLVADEX) 10 MG tablet   Yes No   Sig: Take 10 mg by mouth daily      Facility-Administered Medications: None        Review of Systems    The 10 point Review of Systems is negative other than noted in the HPI or here.     Social History   I have reviewed this patient's social history and updated it with pertinent information if needed.  Social History     Tobacco Use    Smoking status: Never   Substance Use Topics    Alcohol use: No    Drug use: No        Physical Exam   Vital Signs: Temp: 97.9  F (36.6  C) Temp src: Oral BP: (!) 146/83 Pulse: 67   Resp: 10 SpO2: 98 % O2 Device: None (Room air)    Weight: 125 lbs 0 oz    General Appearance: Alert, very pleasant, no distress  Respiratory: CTA B  Cardiovascular: RRR, no murmur. No edema  GI: soft, nt/nd  Skin: no rashes or lesions grossly   Other: CN grossly intact, AMARO      Medical Decision Making       65 MINUTES SPENT BY ME on the date of service doing chart review, history, exam, documentation & further activities per the note.      Data   ------------------------- PAST 24 HR DATA REVIEWED -----------------------------------------------    I have personally reviewed the following data over the past 24 hrs:    4.5  \   13.0   / 207     135 99 24.3 (H) /  256 (H)   4.3 23 0.83 \     ALT: 33 AST: 39 AP: 96 TBILI: 0.5   ALB: 4.3 TOT PROTEIN: 7.9 LIPASE: N/A     Trop: <6 BNP: N/A       Imaging results reviewed over the past 24 hrs:   Recent Results (from the past 24 hours)   XR Chest 2 Views    Narrative    EXAM: XR CHEST 2 VIEWS  LOCATION: [facility]  DATE: 5/7/2025    INDICATION: Chest pain.  COMPARISON: Chest x-ray 2 views 2/16/2019 at 1311 hours.      Impression    IMPRESSION: Both lungs remain clear. No adenopathy or effusion. Normal cardiac size and pulmonary vascularity.  Atherosclerotic thoracic aorta. Mild degenerative changes both shoulders and the spine. Monitoring leads have been placed overlying the chest.

## 2025-05-07 NOTE — ED NOTES
Bed: ED25  Expected date:   Expected time:   Means of arrival:   Comments:  Marga [address] 80F chest pain, 2nd degree heart block, eta [id number]

## 2025-05-07 NOTE — PROGRESS NOTES
Admission/Transfer from: ED  2 RN skin assessment completed. Yes  Significant findings include: none  WOC Nurse Consult Ordered? No

## 2025-05-07 NOTE — CONSULTS
"[facility]    Cardiac Electrophysiology Consultation     Date of Admission:  5/7/2025  Date of Consult (When I saw the patient): 05/07/25    Assessment & Plan   Kristina Khan is a 80 year old female who was admitted on 5/7/2025. I was asked to see the patient for dizziness. Delightful pt with stable CAD noted by [facility] 12/5/22, which showed multivessel CAD which was done for angina. Coronay angiogram then showed moderate 40-50% prox LAD stenosis and 100% occluded distal circ that filled via collaterals. Mild RCA disease. Medical mgmt recommended. Normal LVEF. She has been followed at [facility].    Recently, she has noted intermittent dizziness for which zio was recommended. No actual report for me to review but from Care everywhere there are two separate notes, one indicating 15 secs pause and other < 4 secs. It was recommended to reduce Metoprolol from 50 mg to 25 mg but pt and her son did not receive the message.    Pt presented with chest discomfort described as a \"rock sitting on her chest\" along with lightheadedness similar to what she has been experiencing. Ecg shows nsr at 76 bpm without ST changes. Occasional sinus pauses noted on telemetry. Normal K and trops    Unclear whether pt actually had 15 or <4 secs pause, presumably sinus from zio due to 2 separate notes from [facility] cardiology group. Likely a combination of intrinsic conduction disease and Metoprolol. However, unclear the pauses would improve should Metoprolol be stopped. Moreover, pt likely would need to cont with Metoprolol for her CAD. In light of this uncertainty, it would be best to implant a ppm. I think it's safe for her to have it done as an outpt at [facility]. The patient and her son asked for my recommendation. I believe benefits outwt the risks. I would do it sooner than later. I went over the procedure in details with risks including vascular injury and infection.   Eduardo Claudio MD    Code Status    Full Code    Primary Care " Physician   Komal Estrada    History is obtained from the patient    Past Medical History   I have reviewed this patient's medical history and updated it with pertinent information if needed.   Past Medical History:   Diagnosis Date    Breast cancer (H)     CAD (coronary artery disease)     HLD (hyperlipidemia)     HTN (hypertension)        Past Surgical History   I have reviewed this patient's surgical history and updated it with pertinent information if needed.  Past Surgical History:   Procedure Laterality Date    ADRENALECTOMY Left     tumor    APPENDECTOMY         Prior to Admission Medications   Prior to Admission Medications   Prescriptions Last Dose Informant Patient Reported? Taking?   aspirin 81 MG EC tablet 5/6/2025 Morning  Yes Yes   Sig: Take 81 mg by mouth daily.   isosorbide mononitrate (IMDUR) 30 MG 24 hr tablet 5/6/2025 Morning  Yes Yes   Sig: Take 30 mg by mouth daily.   lisinopril (ZESTRIL) 10 MG tablet Past Week  Yes Yes   Sig: Take 10 mg by mouth daily.   metoprolol succinate ER (TOPROL XL) 50 MG 24 hr tablet 5/6/2025 Morning  Yes Yes   Sig: Take 50 mg by mouth daily.   multivitamin, therapeutic (THERA-VIT) TABS tablet 5/6/2025 Morning  Yes Yes   Sig: Take 1 tablet by mouth daily.   rosuvastatin (CRESTOR) 20 MG tablet 5/6/2025 Evening  Yes Yes   Sig: Take 20 mg by mouth every evening.      Facility-Administered Medications: None     Allergies   Allergies   Allergen Reactions    Tylenol-Codeine #3 [Acetaminophen-Codeine] Other (See Comments)     Cold all over       Social History   I have reviewed this patient's social history and updated it with pertinent information if needed. Kristina Khan  reports that she has never smoked. She does not have any smokeless tobacco history on file. She reports that she does not drink alcohol and does not use drugs.    Family History   I have reviewed this patient's family history and updated it with pertinent information if needed.   Family History   Problem  Relation Age of Onset    Sudden Death Mother     No Known Problems Father     Coronary Artery Disease Son     Coronary Artery Disease Son        Review of Systems   Comprehensive review of systems was performed with pertinent positives and negatives listed in assessment and plan section.    Physical Exam   Temp: 97.9  F (36.6  C) Temp src: Oral BP: 121/54 Pulse: 76   Resp: 14 SpO2: 96 % O2 Device: None (Room air)    Vital Signs with Ranges  Temp:  [97.9  F (36.6  C)] 97.9  F (36.6  C)  Pulse:  [67-79] 76  Resp:  [10-18] 14  BP: (121-160)/(54-85) 121/54  SpO2:  [96 %-98 %] 96 %  125 lbs 0 oz    Constitutional: awake, alert, cooperative, no apparent distress, and appears stated age  Eyes: Lids and lashes normal, pupils equal, round and reactive to light, extra ocular muscles intact, sclera clear, conjunctiva normal  ENT: Normocephalic, without obvious abnormality, atraumatic, sinuses nontender on palpation, external ears without lesions, oral pharynx with moist mucous membranes, tonsils without erythema or exudates, gums normal and good dentition.  Hematologic / Lymphatic: no cervical lymphadenopathy  Respiratory: No increased work of breathing, good air exchange, clear to auscultation bilaterally, no crackles or wheezing  Cardiovascular: Normal apical impulse, regular rate and rhythm, normal S1 and S2, no S3 or S4, and no murmur noted  GI: No scars, normal bowel sounds, soft, non-distended, non-tender, no masses palpated, no hepatosplenomegally  Skin: no bruising or bleeding  Musculoskeletal: There is no redness, warmth, or swelling of the joints.  Full range of motion noted.    Neurologic: Awake, alert,   Neuropsychiatric: General: normal, calm, and normal eye contact    Data   I personally reviewed all recent ECGs and images.  Results for orders placed or performed during the hospital encounter of 05/07/25 (from the past 24 hours)   EKG 12-lead, tracing only   Result Value Ref Range    Systolic Blood Pressure  mmHg     Diastolic Blood Pressure  mmHg    Ventricular Rate 76 BPM    Atrial Rate 76 BPM    CT Interval 164 ms    QRS Duration 84 ms     ms    QTc 429 ms    P Axis 35 degrees    R AXIS 39 degrees    T Axis 54 degrees    Interpretation ECG       Sinus rhythm with marked sinus arrhythmia  Otherwise normal ECG  When compared with ECG of 07-May-2025 02:46, (unconfirmed)  No significant change was found  Confirmed by GENERATED REPORT, COMPUTER ([id number]),  Irma Corey ([id number]) on 5/7/2025 3:04:47 AM     CBC with platelets differential    Narrative    The following orders were created for panel order CBC with platelets differential.  Procedure                               Abnormality         Status                     ---------                               -----------         ------                     CBC with platelets and ...[[id number]]                      Final result                 Please view results for these tests on the individual orders.   Comprehensive metabolic panel   Result Value Ref Range    Sodium 135 135 - 145 mmol/L    Potassium 4.3 3.4 - 5.3 mmol/L    Carbon Dioxide (CO2) 23 22 - 29 mmol/L    Anion Gap 13 7 - 15 mmol/L    Urea Nitrogen 24.3 (H) 8.0 - 23.0 mg/dL    Creatinine 0.83 0.51 - 0.95 mg/dL    GFR Estimate 71 >60 mL/min/1.73m2    Calcium 10.7 (H) 8.8 - 10.4 mg/dL    Chloride 99 98 - 107 mmol/L    Glucose 256 (H) 70 - 99 mg/dL    Alkaline Phosphatase 96 40 - 150 U/L    AST 39 0 - 45 U/L    ALT 33 0 - 50 U/L    Protein Total 7.9 6.4 - 8.3 g/dL    Albumin 4.3 3.5 - 5.2 g/dL    Bilirubin Total 0.5 <=1.2 mg/dL   Troponin T, High Sensitivity   Result Value Ref Range    Troponin T, High Sensitivity <6 <=14 ng/L   CBC with platelets and differential   Result Value Ref Range    WBC Count 4.5 4.0 - 11.0 10e3/uL    RBC Count 4.40 3.80 - 5.20 10e6/uL    Hemoglobin 13.0 11.7 - 15.7 g/dL    Hematocrit 39.5 35.0 - 47.0 %    MCV 90 78 - 100 fL    MCH 29.5 26.5 - 33.0 pg    MCHC 32.9 31.5 - 36.5 g/dL     RDW 13.0 10.0 - 15.0 %    Platelet Count 207 150 - 450 10e3/uL    % Neutrophils 47 %    % Lymphocytes 39 %    % Monocytes 11 %    % Eosinophils 3 %    % Basophils 1 %    % Immature Granulocytes 0 %    NRBCs per 100 WBC 0 <1 /100    Absolute Neutrophils 2.1 1.6 - 8.3 10e3/uL    Absolute Lymphocytes 1.7 0.8 - 5.3 10e3/uL    Absolute Monocytes 0.5 0.0 - 1.3 10e3/uL    Absolute Eosinophils 0.1 0.0 - 0.7 10e3/uL    Absolute Basophils 0.0 0.0 - 0.2 10e3/uL    Absolute Immature Granulocytes 0.0 <=0.4 10e3/uL    Absolute NRBCs 0.0 10e3/uL   [place] Draw    Narrative    The following orders were created for panel order [place] Draw.  Procedure                               Abnormality         Status                     ---------                               -----------         ------                     Extra Blue Top Tube[[id number]]                             Final result                 Please view results for these tests on the individual orders.   Extra Blue Top Tube   Result Value Ref Range    Hold Specimen JIC    XR Chest 2 Views    Narrative    EXAM: XR CHEST 2 VIEWS  LOCATION: [facility]  DATE: 5/7/2025    INDICATION: Chest pain.  COMPARISON: Chest x-ray 2 views 2/16/2019 at 1311 hours.      Impression    IMPRESSION: Both lungs remain clear. No adenopathy or effusion. Normal cardiac size and pulmonary vascularity. Atherosclerotic thoracic aorta. Mild degenerative changes both shoulders and the spine. Monitoring leads have been placed overlying the chest.   Ionized Calcium   Result Value Ref Range    Calcium Ionized Whole Blood 5.1 4.4 - 5.2 mg/dL   Troponin T, High Sensitivity   Result Value Ref Range    Troponin T, High Sensitivity 8 <=14 ng/L       Clinically Significant Risk Factors Present on Admission            # Hypercalcemia: Highest Ca = 10.7 mg/dL in last 2 days, will monitor as appropriate      # Drug Induced Platelet Defect: home medication list includes an antiplatelet  medication   # Hypertension: Home medication list includes antihypertensive(s)

## 2025-05-07 NOTE — PROGRESS NOTES
"[facility]    Medicine Progress Note - Hospitalist Service    Date of Admission:  5/7/2025    Assessment & Plan   Kristina Khan is a 80 year old female admitted on 5/7/2025. 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 Angiogram LAD 40% prox stenosis, occl OM2, cx 40%, other findings  *saw cards 4/7 and was c/o dizziness. Wore ziopatch, had 5 pauses w/ longest up to 15 seconds. They decreased metoprolol 50->25 mg. Has intermittent CP/SOB w/ exertion over the last couple of years  *notes intermittent episodes of lightheadedness, vision going black. Presents w/ onset 0130 of CP, sweats, shakes and SOB. \"Felt like a rock sitting on chest\". NTG appeared to help. In ED AFVSS. Troponin negative. CXR clear. EKG w/ sinus pauses, telemetry from EMS also shows sinus pauses  - Admit to inpatient status  - Telemetry  - tropnin 6--8  - EP consult appreciated  - s/p PPM 5/7  - Echo pending  - resume PTA ASA, Imdur and Crestor  - PTA Toprol XL and Lisinopril held- resume as indicated  - CXR and device interrogation in am     Hypercalcemia  Ca sl elevated at 10.7.   - check ionized Ca 5.1     Hx breast cancer  S/p lumpectomy and XRT.           Diet: NPO for Medical/Clinical Reasons Except for: Meds, Ice Chips    DVT Prophylaxis: Pneumatic Compression Devices  Lane Catheter: Not present  Lines: None     Cardiac Monitoring: ACTIVE order. Indication: Bradycardias (48 hours)  Code Status: Full Code      Clinically Significant Risk Factors Present on Admission            # Hypercalcemia: Highest Ca = 10.7 mg/dL in last 2 days, will monitor as appropriate      # Drug Induced Platelet Defect: home medication list includes an antiplatelet medication   # Hypertension: Home medication list includes " antihypertensive(s)                      Social Drivers of Health    Tobacco Use: Unknown (5/7/2025)    Patient History     Smoking Tobacco Use: Never     Smokeless Tobacco Use: Unknown          Disposition Plan     Medically Ready for Discharge: Anticipated Tomorrow           Radha Boston MD  Hospitalist Service  [facility]  Securely message with [employer] (more info)  Text page via [employer] Paging/Directory   ______________________________________________________________________    Interval History   Saw the patient in am, while still in ER; was seen by EP and plan to have PPM placement  She reported feeling better, no chest pain, no SOB, no dizziness while laying down  No N/V, no abd pain  Discussed with son at bedside     Physical Exam   Vital Signs: Temp: 97.9  F (36.6  C) Temp src: Oral BP: 121/54 Pulse: 76   Resp: 14 SpO2: 96 % O2 Device: None (Room air)    Weight: 125 lbs 0 oz    General Appearance: Awake, alert, NAD  Respiratory: bilateral air entry, no wheezing, no crackles  Cardiovascular: S1S2, RRR, no murmurs  GI: abd- soft, nonT, BS present  Skin: no rashes, no cyanosis   Neuro: AAOX3, no FNDs     Medical Decision Making       45 MINUTES SPENT BY ME on the date of service doing chart review, history, exam, documentation & further activities per the note.      Data     I have personally reviewed the following data over the past 24 hrs:    4.5  \   13.0   / 207     135 99 24.3 (H) /  256 (H)   4.3 23 0.83 \     ALT: 33 AST: 39 AP: 96 TBILI: 0.5   ALB: 4.3 TOT PROTEIN: 7.9 LIPASE: N/A     Trop: 8 BNP: N/A       Imaging results reviewed over the past 24 hrs:   Recent Results (from the past 24 hours)   XR Chest 2 Views    Narrative    EXAM: XR CHEST 2 VIEWS  LOCATION: [facility]  DATE: 5/7/2025    INDICATION: Chest pain.  COMPARISON: Chest x-ray 2 views 2/16/2019 at 1311 hours.      Impression    IMPRESSION: Both lungs remain clear. No adenopathy or  effusion. Normal cardiac size and pulmonary vascularity. Atherosclerotic thoracic aorta. Mild degenerative changes both shoulders and the spine. Monitoring leads have been placed overlying the chest.   EP Device    Narrative    Table formatting from the original result was not included.  Images from the original result were not included.  PROCEDURES PERFORMED:  - Implantation of dual-chamber permanent pacemaker  - Cardiac fluoroscopy  - Conscious sedation, mild-moderate level    INDICATIONS:  Sinus node dysfunction and pauses up to 15 sec      PROCEDURE:  The benefits and risks of the procedure were discussed with the patient in   detail; the patient expressed understanding.  Informed consent was   obtained and placed in the chart.  I determined this patient to be an   appropriate candidate for the planned sedation and procedure and have   reassessed the patient immediately prior to sedation and procedure. The   patient was brought to the EP lab in the fasting, non-sedated state.  We   continuously monitored EKG, vital signs, oxygenation and level of   sedation.  I was present during the entire time that conscious sedation   was provided.  Antibiotic prophylaxis was administered.  The chest was   prepped and draped in the usual sterile fashion.      Local anesthetic was administered.  Access to the axillary vein was   obtained with ultrasound guidance using the modified Seldinger technique.    Two wires were advanced to the IVC.  An incision was made in the left   pectoral area.  Dissection was carried down to the myofascial plane and   then continued caudally to form the pocket.  Adequate hemostasis was   obtained.      Two 7.0 Fr sheaths were introduced for atrial and ventricular leads.  A   His sheath was advanced over a long wire to the mid right ventricle.  A   pacemaker lead was advanced through this sheath to the left bundle region   and screwed into the septum under fluoroscopy.  There was good pacing    morphology with adequate sensing, impedance, and threshold.  The sheaths   were peeled away.    The right atrial lead was advanced to the appendage and screwed into   position under fluoroscopy.  A few different locations were mapped in the   RA with high thresholds.  The final position was tested and found to have   adequate sensing, impedance, and threshold.  No diaphragmatic stimulation   was noted at 10 V output.  The sheath was peeled away and both leads were   secured to the pectoral muscle using Ethibond sutures.    Normal saline was used to irrigate the pocket. The generator was securely   attached to the leads and then placed in the pocket. The device was   sutured in the pocket with an Ethibond  suture. The pocket was closed in   layers.  The deep layers were closed using 2.0 and 3.0 Vicryl and the   subcuticular layer was closed with 4.0 Vicryl suture. Dermabond was   applied to the skin. The incision was covered with a sterile dressing.    There was minimal blood loss (<30 mL) and no immediate complications.  The   patient tolerated the procedure well.    Implanted Hardware and Parameters:  Implant Name Type Inv. Item Serial No.  Lot No. LRB No. Used   Action   LEAD PACEMAKER SELECTSECURE 69CM MD  [id number] - [id number] Leads LEAD   PACEMAKER SELECTSECURE 69CM MD  [id number] [employer]   [id number]  1 Implanted   IMP LEAD PACING BIPOLAR CAPSUREFIX NOVUS 45CM 5076-45 - [id number] Leads   IMP LEAD PACING BIPOLAR CAPSUREFIX NOVUS 45CM 5076-45 [id number]   [employer] [id number]  1 Implanted   PACEMAKER STEFAN MRI XT  - [id number] Pacemaker PACEMAKER STEFAN MRI XT    [id number] [employer] [id number]  1 Implanted             CONCLUSIONS:  - Successful implantation of a dual-chamber permanent pacemaker with   non-selective left bundle pacing  - Routine follow up after discharge      Yordy Caballero MD

## 2025-05-07 NOTE — Clinical Note
Patient education provided.  Refill of lisinopril requested. Patient was last seen on 8/12/19 by ZABRINA Montez and will be following up with her again on 2/12/20. His most recent blood pressure is elevated however during that visit the patient reported he had been out of his blood pressure medication for a months time and had just restarted it. Patient requesting a refill of lisinopril. Per Verbal order from Moni Amos ok to give patient #[phone] with 1 additional refill. Prescription sent.      Wt Readings from Last 1 Encounters:   08/12/19 94.3 kg        BP Readings from Last 2 Encounters:   08/12/19 (!) 142/98   10/03/18 138/80     Lab Results   Component Value Date    SODIUM 137 08/12/2019    POTASSIUM 4.4 08/12/2019    CHLORIDE 106 08/12/2019    CO2 25 08/12/2019    BUN 17 08/12/2019    CREATININE 0.80 08/12/2019    GLUCOSE 109 (H) 08/12/2019     Hemoglobin A1C (%)   Date Value   10/03/2018 6.0 (H)     No results found for: TSH  Lab Results   Component Value Date    CHOLESTEROL 155 08/12/2019    HDL 56 08/12/2019    CALCLDL 52 08/12/2019    TRIGLYCERIDE 233 (H) 08/12/2019     Lab Results   Component Value Date    AST 35 08/12/2019    GPT 65 (H) 08/12/2019    GGTP 34 02/16/2017    ALKPT 57 08/12/2019    BILIRUBIN 0.4 08/12/2019

## 2025-05-07 NOTE — ED TRIAGE NOTES
Awakened from sleep with chest pressure.  EMS EKG showing second degree block (new for pt)   -190 received 324 asa and 0.4 nitroglycerin with pain relief     Triage Assessment (Adult)       Row Name 05/07/25 0245          Triage Assessment    Airway WDL WDL        Respiratory WDL    Respiratory WDL WDL        Skin Circulation/Temperature WDL    Skin Circulation/Temperature WDL WDL        Cardiac WDL    Cardiac WDL X;chest pain        Peripheral/Neurovascular WDL    Peripheral Neurovascular WDL WDL        Cognitive/Neuro/Behavioral WDL    Cognitive/Neuro/Behavioral WDL WDL

## 2025-05-07 NOTE — ED PROVIDER NOTES
"  Emergency Department Note      History of Present Illness   Chief Complaint   Chest Pain     HPI   Kristina Khan is a 80 year old female with history of hypertension who presents to the ED via EMS for evaluation of chest pain. She reports waking up at 0130 with chest pressure, cold sweats, shakes, and shortness of breath. She states \"it feels like a rock is sitting on my chest\". Denies abdominal pain. Recent travel to [place]. EMS administered 0.4 nitroglycerin which provided pain relief.     Independent Historian   None    Review of External Notes   I reviewed the Cardiology note from 4/7/25  Past Medical History   Medical History and Problem List   Hypertension   Ovarian cyst  Dyslipidemia  Breast cancer   Early cataracts    Medications   Coreg  Theragran  Nolvadex   Metoprolol succinate  Lisinopril  Aspirin 81 mg     Surgical History   Appendectomy   Colonoscopy  Adrenal gland tumor resection  Left wrist mass removal  Lumpectomy   Physical Exam   Patient Vitals for the past 24 hrs:   BP Temp Temp src Pulse Resp SpO2 Height Weight   05/07/25 0300 (!) 146/83 -- -- 67 10 98 % -- --   05/07/25 0251 (!) 160/85 97.9  F (36.6  C) Oral 79 18 98 % 1.549 m (5' 1\") 56.7 kg (125 lb)     Physical Exam  Eye:  Pupils are equal, round, and reactive.  Extraocular movements intact.    ENT:  No rhinorrhea.  Moist mucus membranes.  Normal tongue and tonsil.    Cardiac:  Rhythm is regular with frequent pauses noted.  No murmurs, gallops, or rubs.    Pulmonary:  Clear to auscultation bilaterally.  No wheezes, rales, or rhonchi.    Abdomen:  Positive bowel sounds.  Abdomen is soft and non-distended, without focal tenderness.    Musculoskeletal:  Normal movement of all extremities without evidence for deficit.    Skin:  Warm and dry without rashes.    Neurologic:  Non-focal exam without asymmetric weakness or numbness.     Psychiatric:  Normal affect with appropriate interaction with examiner.    Diagnostics   Lab Results   Labs " Ordered and Resulted from Time of ED Arrival to Time of ED Departure   COMPREHENSIVE METABOLIC PANEL - Abnormal       Result Value    Sodium 135      Potassium 4.3      Carbon Dioxide (CO2) 23      Anion Gap 13      Urea Nitrogen 24.3 (*)     Creatinine 0.83      GFR Estimate 71      Calcium 10.7 (*)     Chloride 99      Glucose 256 (*)     Alkaline Phosphatase 96      AST 39      ALT 33      Protein Total 7.9      Albumin 4.3      Bilirubin Total 0.5     TROPONIN T, HIGH SENSITIVITY - Normal    Troponin T, High Sensitivity <6     CBC WITH PLATELETS AND DIFFERENTIAL    WBC Count 4.5      RBC Count 4.40      Hemoglobin 13.0      Hematocrit 39.5      MCV 90      MCH 29.5      MCHC 32.9      RDW 13.0      Platelet Count 207      % Neutrophils 47      % Lymphocytes 39      % Monocytes 11      % Eosinophils 3      % Basophils 1      % Immature Granulocytes 0      NRBCs per 100 WBC 0      Absolute Neutrophils 2.1      Absolute Lymphocytes 1.7      Absolute Monocytes 0.5      Absolute Eosinophils 0.1      Absolute Basophils 0.0      Absolute Immature Granulocytes 0.0      Absolute NRBCs 0.0     IONIZED CALCIUM     Imaging   XR Chest 2 Views   Final Result   IMPRESSION: Both lungs remain clear. No adenopathy or effusion. Normal cardiac size and pulmonary vascularity. Atherosclerotic thoracic aorta. Mild degenerative changes both shoulders and the spine. Monitoring leads have been placed overlying the chest.        EKG   ECG results from 05/07/25   EKG 12-lead, tracing only     Value    Systolic Blood Pressure     Diastolic Blood Pressure     Ventricular Rate 76    Atrial Rate 76    ID Interval 164    QRS Duration 84        QTc 429    P Axis 35    R AXIS 39    T Axis 54    Interpretation ECG      Sinus Pauses  Otherwise normal ECG  Read at 0247       Independent Interpretation   CXR: No infiltrate or pulmonary edema.  ED Course    Medications Administered   Medications   sodium chloride (PF) 0.9% PF flush 3 mL (has no  administration in time range)   sodium chloride (PF) 0.9% PF flush 3 mL (has no administration in time range)     Procedures   Procedures     Discussion of Management   Admitting Hospitalist, Dr. Guerrero    Additional Documentation  None     ED Course   ED Course as of 05/07/25 0432   Wed May 07, 2025   0242 I obtained history and examined the patient as noted above.    0401 I rechecked the patient and explained findings.    0427 I spoke with Dr. Guerrero, hospitalist, who agreed to admit the patient.      Medical Decision Making / Diagnosis   CMS Diagnoses: None    MIPS       None    MDM   Kristina Khan is a 80 year old female with a significant cardiac history presents to us because of ongoing spells of feeling lightheadedness.  Tonight, she felt as though she could pass out and also felt some tightness in her chest, improved after EMS saw her and provided her with some nitroglycerin.    On my exam, I was immediately concerned with her rhythm tracing.  She had consistent dropped beats.  Looking closely at her EKG and rhythm strips, this is not a second-degree AV block but rather sinus pauses, likely some form of sick sinus syndrome.  Looking at her records, she recently wore a Zio patch and had some pauses then as well.  Her pauses now are much more frequent, at times occurring every 3rd or 4th beat.  There was a short period of time where she dropped her heart rate into the 40s.  However, she seemed to rebound on her own.  Heart rate was in the 70s on my reassessments.  Regarding her workup, I am not finding anything more sinister.  Troponin is undetectable.  Electrolytes are normal.  Chest x-ray is clear.    This person requires close monitoring on a cardiac monitor and discussion with EP of how to address these issues.  They recently decreased her dose of metoprolol, though this would not seem to make much of a difference of sinus pauses.  I explained to the patient that they would be evaluated by EP for a  possible pacemaker though they may find other ways of managing this.  Family's questions were answered and they are comfortable plan for admission.  I spoke with hospital medicine who agrees accept care of the patient under observation status.    Disposition   The patient was admitted to the hospital.     Diagnosis     ICD-10-CM    1. Sinus pause  I45.5       2. Lightheadedness  R42       3. History of coronary artery disease  Z86.79            Scribe Disclosure:  I, Shanice Lucero, am serving as a scribe at 3:07 AM on 5/7/2025 to document services personally performed by Trierweiler, Chad A, MD based on my observations and the provider's statements to me.      Trierweiler, Chad A, MD  05/07/25 0677

## 2025-05-08 ENCOUNTER — APPOINTMENT (OUTPATIENT)
Dept: GENERAL RADIOLOGY | Facility: CLINIC | Age: 80
DRG: 244 | End: 2025-05-08
Attending: INTERNAL MEDICINE
Payer: COMMERCIAL

## 2025-05-08 ENCOUNTER — DOCUMENTATION ONLY (OUTPATIENT)
Dept: CARDIOLOGY | Facility: CLINIC | Age: 80
End: 2025-05-08
Payer: COMMERCIAL

## 2025-05-08 ENCOUNTER — APPOINTMENT (OUTPATIENT)
Dept: CARDIOLOGY | Facility: CLINIC | Age: 80
DRG: 244 | End: 2025-05-08
Attending: INTERNAL MEDICINE
Payer: COMMERCIAL

## 2025-05-08 VITALS
DIASTOLIC BLOOD PRESSURE: 74 MMHG | TEMPERATURE: 97.7 F | HEIGHT: 61 IN | SYSTOLIC BLOOD PRESSURE: 136 MMHG | HEART RATE: 74 BPM | OXYGEN SATURATION: 96 % | WEIGHT: 122.5 LBS | RESPIRATION RATE: 16 BRPM | BODY MASS INDEX: 23.13 KG/M2

## 2025-05-08 DIAGNOSIS — Z95.0 CARDIAC PACEMAKER IN SITU: Primary | ICD-10-CM

## 2025-05-08 LAB
ANION GAP SERPL CALCULATED.3IONS-SCNC: 11 MMOL/L (ref 7–15)
ATRIAL RATE - MUSE: 76 BPM
BUN SERPL-MCNC: 27.2 MG/DL (ref 8–23)
CALCIUM SERPL-MCNC: 9.1 MG/DL (ref 8.8–10.4)
CHLORIDE SERPL-SCNC: 105 MMOL/L (ref 98–107)
CREAT SERPL-MCNC: 0.87 MG/DL (ref 0.51–0.95)
DIASTOLIC BLOOD PRESSURE - MUSE: NORMAL MMHG
EGFRCR SERPLBLD CKD-EPI 2021: 67 ML/MIN/1.73M2
GLUCOSE SERPL-MCNC: 187 MG/DL (ref 70–99)
HCO3 SERPL-SCNC: 23 MMOL/L (ref 22–29)
INTERPRETATION ECG - MUSE: NORMAL
LVEF ECHO: NORMAL
P AXIS - MUSE: 40 DEGREES
POTASSIUM SERPL-SCNC: 4 MMOL/L (ref 3.4–5.3)
PR INTERVAL - MUSE: 196 MS
QRS DURATION - MUSE: 84 MS
QT - MUSE: 400 MS
QTC - MUSE: 450 MS
R AXIS - MUSE: 28 DEGREES
SODIUM SERPL-SCNC: 139 MMOL/L (ref 135–145)
SYSTOLIC BLOOD PRESSURE - MUSE: NORMAL MMHG
T AXIS - MUSE: 44 DEGREES
VENTRICULAR RATE- MUSE: 76 BPM

## 2025-05-08 PROCEDURE — 250N000013 HC RX MED GY IP 250 OP 250 PS 637: Performed by: INTERNAL MEDICINE

## 2025-05-08 PROCEDURE — 255N000002 HC RX 255 OP 636: Performed by: INTERNAL MEDICINE

## 2025-05-08 PROCEDURE — 250N000013 HC RX MED GY IP 250 OP 250 PS 637: Performed by: PHYSICIAN ASSISTANT

## 2025-05-08 PROCEDURE — 36415 COLL VENOUS BLD VENIPUNCTURE: CPT | Performed by: INTERNAL MEDICINE

## 2025-05-08 PROCEDURE — 99232 SBSQ HOSP IP/OBS MODERATE 35: CPT | Mod: 25 | Performed by: PHYSICIAN ASSISTANT

## 2025-05-08 PROCEDURE — 82565 ASSAY OF CREATININE: CPT | Performed by: INTERNAL MEDICINE

## 2025-05-08 PROCEDURE — C8929 TTE W OR WO FOL WCON,DOPPLER: HCPCS

## 2025-05-08 PROCEDURE — 93306 TTE W/DOPPLER COMPLETE: CPT | Mod: 26 | Performed by: INTERNAL MEDICINE

## 2025-05-08 PROCEDURE — 999N000065 XR CHEST 2 VIEWS

## 2025-05-08 PROCEDURE — 99238 HOSP IP/OBS DSCHRG MGMT 30/<: CPT | Performed by: STUDENT IN AN ORGANIZED HEALTH CARE EDUCATION/TRAINING PROGRAM

## 2025-05-08 RX ADMIN — PERFLUTREN 10 ML: 6.52 INJECTION, SUSPENSION INTRAVENOUS at 09:31

## 2025-05-08 RX ADMIN — ASPIRIN 81 MG: 81 TABLET, COATED ORAL at 09:54

## 2025-05-08 RX ADMIN — LISINOPRIL 10 MG: 10 TABLET ORAL at 09:58

## 2025-05-08 RX ADMIN — ACETAMINOPHEN 650 MG: 325 TABLET, FILM COATED ORAL at 09:54

## 2025-05-08 RX ADMIN — ISOSORBIDE MONONITRATE 30 MG: 30 TABLET, EXTENDED RELEASE ORAL at 09:54

## 2025-05-08 RX ADMIN — ACETAMINOPHEN 650 MG: 325 TABLET, FILM COATED ORAL at 04:59

## 2025-05-08 ASSESSMENT — ACTIVITIES OF DAILY LIVING (ADL)
ADLS_ACUITY_SCORE: 30
ADLS_ACUITY_SCORE: 32
ADLS_ACUITY_SCORE: 30
ADLS_ACUITY_SCORE: 32
ADLS_ACUITY_SCORE: 32

## 2025-05-08 NOTE — DISCHARGE SUMMARY
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 You are scheduled to see them on 5/21/2025.    - Your primary care provider in 2-4 weeks for a post-hospitalization follow up. You will need to call your primary care office to schedule an appointment              Unresulted Labs Ordered in the Past 30 Days of this Admission       No orders found for last 31 day(s).          Discharge Disposition   Discharged to home  Condition at discharge: Stable    Hospital Course   Kristina Khan is a 80 year old female admitted on 5/7/2025. 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 Angiogram LAD 40% prox stenosis, occl OM2, cx 40%, other findings  *saw cards 4/7 and was c/o dizziness. Wore ziopatch, had 5 pauses w/ longest up to 15 seconds (although records not 100% clear on duration). They decreased metoprolol 50->25 mg but patient/family did not receive this message. Has intermittent CP/SOB w/ exertion over the last couple of years  *notes intermittent episodes of lightheadedness, vision going black. Presents w/ onset 0130 of CP, sweats, shakes and SOB. \"Felt like a rock sitting on chest\". NTG appeared to help. In ED AFVSS. Troponin negative. CXR clear. EKG w/ sinus pauses, telemetry from EMS also shows sinus " Anesthesia Post-op Note    Patient: Norm Jackson  Procedure(s) Performed: TRANSURETHRAL WATERJET ABLATION OF PROSTATE (Bladder)  Anesthesia type: General    Vitals Value Taken Time   Temp 36.5 °C (97.7 °F) 03/31/25 1112   Pulse 62 03/31/25 1112   Resp 17 03/31/25 1045   SpO2 95 % 03/31/25 1112   /72 03/31/25 1112         Patient Location: PACU Phase 1  Post-op Vital Signs:stable  Level of Consciousness: awake and alert  Respiratory Status: spontaneous ventilation and unassisted  Cardiovascular stable  Hydration: euvolemic  Pain Management: adequately controlled  Handoff: Handoff to receiving clinician was performed and questions were answered  Vomiting: none  Nausea: None  Airway Patency:patent  Post-op Assessment: no complications, patient tolerated procedure well, no evidence of recall, dentition, mouth, tongue, and oropharynx unchanged , moving all extremities and No Corneal Abrasion      No notable events documented.                       pauses. Troponin flat at 6->8.  *TTE shows LVEF 55-60%, indeterminate LV filling pressures, no regional WMAs, no significant valvular disease  - Evaluated by EP and felt patient's pauses of unknown duration are multifactorial including intrinsic induction disease and metoprolol.  -Dual-chamber PPM placed on 5/7/2025.  -On 5/8/25, EP cleared patient for discharge after reviewing CXR and device interrogation. She is setup for followup with device clinic. She should also followup with her PCP.  - PTA ASA, Imdur and Crestor  - PTA Toprol XL and Lisinopril restarted per EP     Hypercalcemia  Ca sl elevated at 10.7. Ionized calcium WNL.     Hx breast cancer  S/p lumpectomy and XRT.         Consultations This Hospital Stay   ELECTROPHYSIOLOGY IP CONSULT    Code Status   Prior    Time Spent on this Encounter   I, Curt Bonilla MD, personally saw the patient today and spent less than or equal to 30 minutes discharging this patient.       Curt Bonilla MD  [facility] CORONARY CARE UNIT  [address]  [facility] [address]  Phone: [phone]  ______________________________________________________________________    Physical Exam   Vital Signs: Temp: 97.7  F (36.5  C) Temp src: Oral BP: 136/74 Pulse: 74   Resp: 16 SpO2: 96 % O2 Device: None (Room air)    Weight: 122 lbs 8 oz  Constitutional: Awake, alert, cooperative, no apparent distress.    Pulmonary: No increased work of breathing, good air exchange, clear to auscultation bilaterally, no crackles or wheezing.  Cardiovascular: Regular rate and rhythm, normal S1 and S2, no S3 or S4. No murmurs, rubs, or gallops noted. PPM pocket noted.  GI: Normal bowel sounds, soft, non-distended, non-tender.  No guarding or rebound.  Skin/Integumen: No cyanosis or jaundice. No rashes noted.  Extremities: No lower extremity edema.  Neuro: A&Ox4. Conversant. Responds appropriately in conversation. Moves all extremities with no focal deficit identified.               Primary Care Physician   Komal Estrada    Discharge Orders      Reason for your hospital stay    Dear Kristina Khan    Your were hospitalized at [facility] with pauses in your heart rhythm. You were evaluated by the electrophysiology team and they placed a pacemaker for this. Today you are ready to be discharged home.  If you develop lightheadedness, dizziness, loss of consciousness, chest pain, shortness of breath please seek medical attention.    Please follow up with:  - Pacemaker/device clinic. You are scheduled to see them on 5/21/2025.  - Your primary care provider in 2-4 weeks for a post-hospitalization follow up.    It was a pleasure meeting with you today. Thank you for allowing our team the privilege of caring for you today. You are the reason we are here, and I truly hope we provided you with the excellent service you deserve. Please let us know if there is anything else we can do for you so that we can be sure you are leaving completely satisfied with your care experience.    Your hospital unit at the time of discharge is 2nd floor cardiac unit so if you have any questions please call the hospital at [phone] and ask to talk to a nurse on that unit.    Take care!    Curt Bonilla MD  Internal Medicine  [facility]     Activity    - Avoid raising left arm above the level of the shoulder for 2 weeks.(until 5/21/25)  - No lifting >10 pounds for the first week   - For strenuous sports such as tennis, pickle ball, basketball, golf, or weight lifting wait 4 weeks to ensure stable lead healing.   - If there is a pressure dressing in place, this can be removed after 24 hours.   - Leave Aquacel dressing in place.  Okay to shower the day after the procedure.  If this begins to peel up, contact the device clinic.    - Aquacel dressing and steri-strips will be removed at 1 week device check, as appropriate  - Limit driving for: 1 week (PPM)  -  Watch for redness, drainage, warmth, or fever. Call device clinic if any signs of infection.   - No soaking in bath tub, swimming pool or hot tub until you are cleared at your 6-8 week check     Follow Up    --------------------------------------  Please follow up with:  - Pacemaker/device clinic. You are scheduled to see them on 5/21/2025.    - Your primary care provider in 2-4 weeks for a post-hospitalization follow up. You will need to call your primary care office to schedule an appointment     When to contact your care team    - Call Device Clinic with increased swelling, drainage, or fever > 101 degrees.     Diet    Follow this diet upon discharge: Current Diet:Orders Placed This Encounter      Regular Diet Adult       Significant Results and Procedures   Most Recent 3 CBC's:  Recent Labs   Lab Test 05/07/25 0253 12/28/22  0730 06/25/22  1240   WBC 4.5 4.1 4.2   HGB 13.0 11.4* 12.9   MCV 90 91 91    192 240     Most Recent 3 BMP's:  Recent Labs   Lab Test 05/08/25  0558 05/07/25  0253 12/28/22  0730    135 137   POTASSIUM 4.0 4.3 4.2   CHLORIDE 105 99 105   CO2 23 23 25   BUN 27.2* 24.3* 20   CR 0.87 0.83 0.61   ANIONGAP 11 13 7   KIERAN 9.1 10.7* 9.6   * 256* 129*     Most Recent 2 LFT's:  Recent Labs   Lab Test 05/07/25  0253 12/28/22  0730   AST 39 32   ALT 33 24   ALKPHOS 96 40   BILITOTAL 0.5 0.9   ,   Results for orders placed or performed during the hospital encounter of 05/07/25   XR Chest 2 Views    Narrative    EXAM: XR CHEST 2 VIEWS  LOCATION: [facility]  DATE: 5/7/2025    INDICATION: Chest pain.  COMPARISON: Chest x-ray 2 views 2/16/2019 at 1311 hours.      Impression    IMPRESSION: Both lungs remain clear. No adenopathy or effusion. Normal cardiac size and pulmonary vascularity. Atherosclerotic thoracic aorta. Mild degenerative changes both shoulders and the spine. Monitoring leads have been placed overlying the chest.   X-ray Chest 2 vws*    Narrative     EXAM: XR CHEST 2 VIEWS  LOCATION: [facility]  DATE: 5/8/2025    INDICATION: Status post pacer ICD  COMPARISON: 05/07/2025      Impression    IMPRESSION: New cardiac pacemaker with leads in the right atrium and right ventricle. No pneumothorax. The lungs are clear.   EP Device    Narrative    Table formatting from the original result was not included.  Images from the original result were not included.  PROCEDURES PERFORMED:  - Implantation of dual-chamber permanent pacemaker  - Cardiac fluoroscopy  - Conscious sedation, mild-moderate level    INDICATIONS:  Sinus node dysfunction and pauses up to 15 sec      PROCEDURE:  The benefits and risks of the procedure were discussed with the patient in   detail; the patient expressed understanding.  Informed consent was   obtained and placed in the chart.  I determined this patient to be an   appropriate candidate for the planned sedation and procedure and have   reassessed the patient immediately prior to sedation and procedure. The   patient was brought to the EP lab in the fasting, non-sedated state.  We   continuously monitored EKG, vital signs, oxygenation and level of   sedation.  I was present during the entire time that conscious sedation   was provided.  Antibiotic prophylaxis was administered.  The chest was   prepped and draped in the usual sterile fashion.      Local anesthetic was administered.  Access to the axillary vein was   obtained with ultrasound guidance using the modified Seldinger technique.    Two wires were advanced to the IVC.  An incision was made in the left   pectoral area.  Dissection was carried down to the myofascial plane and   then continued caudally to form the pocket.  Adequate hemostasis was   obtained.      Two 7.0 Fr sheaths were introduced for atrial and ventricular leads.  A   His sheath was advanced over a long wire to the mid right ventricle.  A   pacemaker lead was advanced through this sheath to the  left bundle region   and screwed into the septum under fluoroscopy.  There was good pacing   morphology with adequate sensing, impedance, and threshold.  The sheaths   were peeled away.    The right atrial lead was advanced to the appendage and screwed into   position under fluoroscopy.  A few different locations were mapped in the   RA with high thresholds.  The final position was tested and found to have   adequate sensing, impedance, and threshold.  No diaphragmatic stimulation   was noted at 10 V output.  The sheath was peeled away and both leads were   secured to the pectoral muscle using Ethibond sutures.    Normal saline was used to irrigate the pocket. The generator was securely   attached to the leads and then placed in the pocket. The device was   sutured in the pocket with an Ethibond  suture. The pocket was closed in   layers.  The deep layers were closed using 2.0 and 3.0 Vicryl and the   subcuticular layer was closed with 4.0 Vicryl suture. Dermabond was   applied to the skin. The incision was covered with a sterile dressing.    There was minimal blood loss (<30 mL) and no immediate complications.  The   patient tolerated the procedure well.    Implanted Hardware and Parameters:  Implant Name Type Inv. Item Serial No.  Lot No. LRB No. Used   Action   LEAD PACEMAKER SELECTSECURE 69CM MD  [id number] - [id number] Leads LEAD   PACEMAKER SELECTSECURE 69CM MD  [id number] [employer]   [id number]  1 Implanted   IMP LEAD PACING BIPOLAR CAPSUREFIX NOVUS 45CM 5076-45 - [id number] Leads   IMP LEAD PACING BIPOLAR CAPSUREFIX NOVUS 45CM 5076-45 [id number]   [employer] [id number]  1 Implanted   PACEMAKER STEFAN MRI XT  - [id number] Pacemaker PACEMAKER STEFAN MRI XT    [id number] [employer] [id number]  1 Implanted             CONCLUSIONS:  - Successful implantation of a dual-chamber permanent pacemaker with   non-selective left bundle pacing  - Routine follow up after discharge      Yordy  MD Federico      Echocardiogram Complete     Value    LVEF  55-60%    Narrative    [id number]  [id number]  [id number]  [id number]^PETRA^KOJO^NEGAR     [facility]  Echocardiography Laboratory  [address] [place] [address]     Name: THUAN SANCHEZ  MRN: [id number]  : 1945  Study Date: 2025 08:58 AM  Age: 80 yrs  Gender: Female  Patient Location: [facility]  Reason For Study: Chest Pain  Ordering Physician: KOJO LAMBERT  Referring Physician: Komal Estrada  Performed By: Rony Carmona     BSA: 1.5 m2  Height: 61 in  Weight: 122 lb  HR: 82  BP: 136/74 mmHg  ______________________________________________________________________________  Procedure  Echocardiogram with two-dimensional, color and spectral Doppler. Definity (NDC  #85468-182) given intravenously.  ______________________________________________________________________________  Interpretation Summary     The left ventricle is normal in size.  The visual ejection fraction is 55-60%.  Diastolic Doppler findings (E/E' ratio and/or other parameters) suggest left  ventricular filling pressures are indeterminate.  No regional wall motion abnormalities noted.  No significant valvular heart disease.  Technically challenging imaging reduces sensitivity and specificity of  findings.  ______________________________________________________________________________  Left Ventricle  The left ventricle is normal in size. There is normal left ventricular wall  thickness. Diastolic Doppler findings (E/E' ratio and/or other parameters)  suggest left ventricular filling pressures are indeterminate. The visual  ejection fraction is 55-60%. No regional wall motion abnormalities noted.     Right Ventricle  The right ventricle is normal in size and function.     Atria  Normal left atrial size. Right atrial size is normal. There is no color  Doppler evidence of an atrial shunt.     Mitral Valve  The mitral valve leaflets are mildly thickened.  There is trace mitral  regurgitation.     Tricuspid Valve  There is trace tricuspid regurgitation. The right ventricular systolic  pressure is approximated at 23.9 mmHg plus the right atrial pressure. IVC  diameter <2.1 cm collapsing >50% with sniff suggests a normal RA pressure of 3  mmHg.     Aortic Valve  The aortic valve is not well visualized. No aortic regurgitation is present.     Pulmonic Valve  There is trace pulmonic valvular regurgitation.     Vessels  Normal size aorta. The aortic root is normal size.     Pericardium  Trivial pericardial effusion. There are no echocardiographic indications of  cardiac tamponade.     Rhythm  Sinus rhythm was noted.  ______________________________________________________________________________  MMode/2D Measurements & Calculations  IVSd: 1.1 cm     LVIDd: 3.2 cm  LVIDs: 2.2 cm  LVPWd: 1.0 cm  FS: 30.9 %  LV mass(C)d: 101.2 grams  LV mass(C)dI: 66.1 grams/m2  Ao root diam: 2.9 cm  asc Aorta Diam: 2.9 cm  Ao root diam index Ht(cm/m): 1.9  Ao root diam index BSA (cm/m2): 1.9  Asc Ao diam index BSA (cm/m2): 1.9  Asc Ao diam index Ht(cm/m): 1.9  LA Volume (BP): 31.2 ml     LA Volume Index (BP): 20.4 ml/m2  RWT: 0.66  TAPSE: 1.9 cm     Doppler Measurements & Calculations  MV E max lenard: 61.3 cm/sec  MV A max lenard: 101.0 cm/sec  MV E/A: 0.61  MV dec time: 0.19 sec  PA acc time: 0.14 sec  TR max lenard: 244.5 cm/sec  TR max P.9 mmHg  E/E' av.4  Lateral E/e': 13.7  Medial E/e': 13.1  RV S Lenard: 18.1 cm/sec     ______________________________________________________________________________  Report approved by: Robert Kim MD on 2025 12:01 PM             Discharge Medications   Discharge Medication List as of 2025  2:03 PM        CONTINUE these medications which have NOT CHANGED    Details   aspirin 81 MG EC tablet Take 81 mg by mouth daily., Historical      isosorbide mononitrate (IMDUR) 30 MG 24 hr tablet Take 30 mg by mouth daily., Historical      lisinopril  (ZESTRIL) 10 MG tablet Take 10 mg by mouth daily., Historical      metoprolol succinate ER (TOPROL XL) 50 MG 24 hr tablet Take 50 mg by mouth daily., Historical      multivitamin, therapeutic (THERA-VIT) TABS tablet Take 1 tablet by mouth daily., Historical      rosuvastatin (CRESTOR) 20 MG tablet Take 20 mg by mouth every evening., Historical           Allergies   Allergies   Allergen Reactions    Tylenol-Codeine #3 [Acetaminophen-Codeine] Other (See Comments)     Cold all over

## 2025-05-08 NOTE — PROGRESS NOTES
"[facility]    EP Progress Note    Date of Service (when I saw the patient): 05/08/2025     Assessment & Plan   Kristina Khan is a 80 year old female with h/o CAD, HTN, dyslipidemia and h/o breast cancer who was admitted on 5/7/2025 with c/o CP, diaphoresis, SOB and shakiness. EKG with sinus pauses. Dr. Lundberg consulted 5/7 and recommended PPM implantation.    Sinus pauses -   Had been c/o dizziness and OP ZioPatch ([facility]) reportedly showed 15s pause and <4s pause. Metoprolol XL was to be reduced but message had not been seen.   In ER, c/o chest discomfort like a \"rock on chest\" with lightheadedness, similar to what monitor had been worn for and d/t concerns, recommended for PPM implantation    Echo this admission PENDING    Now s/p dual chamber Medtronic PPM 5/7/2025  CXR PENDING - reviewed by Dr. Lundberg and is acceptable  Device interrogation (Karri) with nl function. 5.2% AP and <0.1%  in AAI/DDD 60/130.   Device teaching (Yenifer) PENDING   EKG SR 76 bpm with nl intervals     PLAN:   1. Device RN will arrange follow-up; may want to switch back to [facility] following initial follow-up  2. OK to resume Metoprolol XL as PPM in place per. Dr. Lundberg      2. HTN  -   PTA on Metoprolol XL 50 mg daily, lisinopril 10 and Imdur 30.  Here, Metoprolol XL held before PPM implant  BPs 120-140     PLAN:   1. Resume lisinopril 10    3. CAD -   PTA on ASA, Imdur 30, lisinopril 10, Metoprolol XL 50 and Crestor 20  H/o cath 12/2022 with LAD 40%, Cx 40%, occluded OM2 treated medically  Echo this admission PENDING      PLAN:   1. Continue current meds; resume lisinopril      Rehana Lopez PA-C, MSPAS    Interval History   Feeling \"good\"  Minimal PPM site tenderness  No issues with lightheadedness, dizziness  Wonders if needs Metoprolol (son says \"always likes to try to get off meds\")    Physical Exam   Temp: 97.3  F (36.3  C) Temp src: Oral BP: (!) 153/72 Pulse: 83   Resp: 16 SpO2: 96 % O2 Device: None (Room air) " Oxygen Delivery: 2 LPM  Vitals:    05/07/25 0251 05/07/25 1231 05/08/25 0700   Weight: 56.7 kg (125 lb) 55.6 kg (122 lb 8 oz) 55.6 kg (122 lb 8 oz)     Vital Signs with Ranges  Temp:  [97.3  F (36.3  C)-97.8  F (36.6  C)] 97.3  F (36.3  C)  Pulse:  [66-88] 83  Resp:  [9-17] 16  BP: (114-161)/(60-96) 153/72  SpO2:  [96 %-100 %] 96 %  No intake/output data recorded.    Telemetry: SR, occasional AP    Constitutional: Awake, alert  Respiratory: CTA B  Cardiovascular: Regular. PPM pocket without ecchymosis. Minimal tenderness  Musculoskeletal: no edema    Medications   Current Facility-Administered Medications   Medication Dose Route Frequency Provider Last Rate Last Admin    sodium chloride 0.9 % infusion   Intravenous Continuous Luís Guerrero MD 75 mL/hr at 05/07/25 1140 Rate Verify at 05/07/25 1140     Current Facility-Administered Medications   Medication Dose Route Frequency Provider Last Rate Last Admin    acetaminophen (TYLENOL) tablet 650 mg  650 mg Oral BID Yordy Caballero MD        aspirin EC tablet 81 mg  81 mg Oral Daily Radha Boston MD        isosorbide mononitrate (IMDUR) 24 hr tablet 30 mg  30 mg Oral Daily Radha Boston MD        [Held by provider] lisinopril (ZESTRIL) tablet 10 mg  10 mg Oral Daily Radha Boston MD        [Held by provider] metoprolol succinate ER (TOPROL XL) 24 hr tablet 50 mg  50 mg Oral Daily Radha Boston MD        rosuvastatin (CRESTOR) tablet 20 mg  20 mg Oral QPM Radha Boston MD   20 mg at 05/07/25 2334    sodium chloride (PF) 0.9% PF flush 3 mL  3 mL Intracatheter Q8H [facility] Luís Guerrero MD           Data   I personally reviewed the EKG tracing showing SR 76 bpm.  Results for orders placed or performed during the hospital encounter of 05/07/25 (from the past 24 hours)   EP Device    Narrative    Table formatting from the original result was not included.  Images from the original result were not included.  PROCEDURES  PERFORMED:  - Implantation of dual-chamber permanent pacemaker  - Cardiac fluoroscopy  - Conscious sedation, mild-moderate level    INDICATIONS:  Sinus node dysfunction and pauses up to 15 sec      PROCEDURE:  The benefits and risks of the procedure were discussed with the patient in   detail; the patient expressed understanding.  Informed consent was   obtained and placed in the chart.  I determined this patient to be an   appropriate candidate for the planned sedation and procedure and have   reassessed the patient immediately prior to sedation and procedure. The   patient was brought to the EP lab in the fasting, non-sedated state.  We   continuously monitored EKG, vital signs, oxygenation and level of   sedation.  I was present during the entire time that conscious sedation   was provided.  Antibiotic prophylaxis was administered.  The chest was   prepped and draped in the usual sterile fashion.      Local anesthetic was administered.  Access to the axillary vein was   obtained with ultrasound guidance using the modified Seldinger technique.    Two wires were advanced to the IVC.  An incision was made in the left   pectoral area.  Dissection was carried down to the myofascial plane and   then continued caudally to form the pocket.  Adequate hemostasis was   obtained.      Two 7.0 Fr sheaths were introduced for atrial and ventricular leads.  A   His sheath was advanced over a long wire to the mid right ventricle.  A   pacemaker lead was advanced through this sheath to the left bundle region   and screwed into the septum under fluoroscopy.  There was good pacing   morphology with adequate sensing, impedance, and threshold.  The sheaths   were peeled away.    The right atrial lead was advanced to the appendage and screwed into   position under fluoroscopy.  A few different locations were mapped in the   RA with high thresholds.  The final position was tested and found to have   adequate sensing, impedance, and  threshold.  No diaphragmatic stimulation   was noted at 10 V output.  The sheath was peeled away and both leads were   secured to the pectoral muscle using Ethibond sutures.    Normal saline was used to irrigate the pocket. The generator was securely   attached to the leads and then placed in the pocket. The device was   sutured in the pocket with an Ethibond  suture. The pocket was closed in   layers.  The deep layers were closed using 2.0 and 3.0 Vicryl and the   subcuticular layer was closed with 4.0 Vicryl suture. Dermabond was   applied to the skin. The incision was covered with a sterile dressing.    There was minimal blood loss (<30 mL) and no immediate complications.  The   patient tolerated the procedure well.    Implanted Hardware and Parameters:  Implant Name Type Inv. Item Serial No.  Lot No. LRB No. Used   Action   LEAD PACEMAKER SELECTSECURE 69CM MD  [id number] - [id number] Leads LEAD   PACEMAKER SELECTSECURE 69CM MD  [id number] [employer]   [id number]  1 Implanted   IMP LEAD PACING BIPOLAR CAPSUREFIX NOVUS 45CM 5076-45 - [id number] Leads   IMP LEAD PACING BIPOLAR CAPSUREFIX NOVUS 45CM 5076-45 [id number]   [employer] [id number]  1 Implanted   PACEMAKER STEFAN MRI XT DR - [id number] Pacemaker PACEMAKER STEFAN MRI XT    [id number] [employer] [id number]  1 Implanted             CONCLUSIONS:  - Successful implantation of a dual-chamber permanent pacemaker with   non-selective left bundle pacing  - Routine follow up after discharge      Yordy Caballero MD      EKG 12-lead, tracing only   Result Value Ref Range    Systolic Blood Pressure  mmHg    Diastolic Blood Pressure  mmHg    Ventricular Rate 76 BPM    Atrial Rate 76 BPM    AK Interval 196 ms    QRS Duration 84 ms     ms    QTc 450 ms    P Axis 40 degrees    R AXIS 28 degrees    T Axis 44 degrees    Interpretation ECG       Sinus rhythm  Normal ECG  When compared with ECG of 07-May-2025 02:46,  No significant change was found      Basic metabolic panel   Result Value Ref Range    Sodium 139 135 - 145 mmol/L    Potassium 4.0 3.4 - 5.3 mmol/L    Chloride 105 98 - 107 mmol/L    Carbon Dioxide (CO2) 23 22 - 29 mmol/L    Anion Gap 11 7 - 15 mmol/L    Urea Nitrogen 27.2 (H) 8.0 - 23.0 mg/dL    Creatinine 0.87 0.51 - 0.95 mg/dL    GFR Estimate 67 >60 mL/min/1.73m2    Calcium 9.1 8.8 - 10.4 mg/dL    Glucose 187 (H) 70 - 99 mg/dL

## 2025-05-08 NOTE — PLAN OF CARE
Hx: CAD, HTN, HLD, breast cancer  VS: VSS  Tele/Cardiac: SR  [place]/Neuro: A&O x4  Resp: RA  Pain: 2/10, tylenol given  GI/: WDL  Skin: WDL  IV Lines/Drains: PIV SL  Activity/Diet: SBA, reg diet  Plan: Xray and device interrogation

## 2025-05-08 NOTE — PLAN OF CARE
A&Ox4, denies CP or SOB. Tele SR, VSS on RA. PPM WDL, CMS intact.  VSS, not in pain at time of discharge. Pt states all belongings and paperwork are accounted for. Discharge education complete including medications, follow up appointments, site care and all instructions. Pt verbalized understanding. All questions answered. Family here to drive pt home.

## 2025-05-08 NOTE — PLAN OF CARE
[id number]. A&O x 4. Patient denies pain. VSS, on RA. Up with SBA. Tele: SR w/ occasional A pacing. PPM placed today, DDD . Pacer site, WDL. Tylenol and ice applied x1. Plan for cxray and device interrogation tomorrow. Pt resting comfortably at this time. Son at bedside.

## 2025-05-08 NOTE — DISCHARGE INSTRUCTIONS
Discharge Instructions for Pacemaker Implantation    You have had a procedure to insert a pacemaker. Once inside your body, this small electronic device helps keep your heart from beating too slowly. A pacemaker can t fix existing heart problems. But it can help you feel better and have more energy. As you recover, follow all of the instructions you are given, including those below.    Activity  Follow the instructions you are given about limiting your activity.  Do not raise your affected arm above shoulder level for 2 weeks (until 5/21/25). This gives the device lead wires time to attach securely inside your heart.  No lifting greater than 10 pounds for the first week.  A gallon of milk weighs about 8 pounds.    Refrain from strenuous sports such as tennis, pickle ball, basketball, golf, or weight lifting for 4 weeks to ensure stable lead healing.   Ask your doctor when you can expect to return to work.  You can still exercise. It s good for your body and your heart. Talk with your doctor about an exercise plan.  No driving for 7 days    Other Precautions  Follow your doctor's directions carefully for wound care:   If you have a pressure dressing in place (large amount of gauze with a stretchy bandage over it that goes across your whole chest), this can be removed 24 hours after your procedure.  Do not remove the Aquacel dressing (tan, rectangular, rubbery looking bandage) that is underneath it.   Leave the Aquacel dressing in place until 5/16/25.  You may remove with Steri-strips.  Please call if you have any issues ([phone])  The Aquacel dressing is water proof, you may shower with it on starting the morning after your procedure. If the edges of the dressing are peeling off, do NOT shower and contact your clinic for further instruction at [phone].    No soaking in bath tub, swimming pool or hot tub until you are cleared at your 6-8 week check  Never put any creams, lotions, powders, or products like  peroxide on your incision unless your doctor tells you to.    Before you receive any treatment, tell all health care providers (including your dentist) that you have a pacemaker.  You will be given an ID card that contains information about your pacemaker. Always carry this card with you. You can show this card if your pacemaker sets off a metal detector. You should also show it to avoid screening with a hand-held security wand.  Keep your cell phone away from your pacemaker. Don t carry the phone in your shirt pocket, even when it s turned off.  Avoid strong magnets. Examples are those used in MRIs or in hand-held security wands.  Avoid strong electrical fields. Examples are those made by radio transmitting towers,  ham  radios, and heavy-duty electrical equipment.  Avoid leaning over the open russ of a running car. A running engine creates an electrical field. Most household and yard appliances will not cause any problems. If you use any large power tools, such as an industrial , talk with your doctor.     Follow-Up  Follow up in the device clinic as scheduled.   You have an appointment scheduled on 5/21/25 in [place]  Make regular follow-up appointments with your device clinic. They will check the pacemaker to make sure it s working properly.    When to Call Your Device Clinic [phone]  Call your Device Clinic if you have any of the following:  Dizziness  Chest pain  Lack of energy  Fainting spells  Rapid pulse or pounding heartbeat  Shortness of breath  Increased pain around your pacemaker  Fever above 100.4 F (38 C) or other signs of infection (redness, swelling, drainage, or warmth at the incision site)     [id number] [employer]. [address] [place] [address]. All rights reserved. This information is not intended as a substitute for professional medical care. Always follow your healthcare professional's instructions.    [facility] in [place]:  [phone]  Device Clinic (Monday to Friday, 8am-4pm): [phone]  *The device clinic is closed on weekends and holidays.  Any calls received during this time will be answered on the next business  day. For any urgent questions after hours, please call the main clinic number and you will be put in contact with the cardiologist on call.

## 2025-05-08 NOTE — PROGRESS NOTES
Post Device Implant InstructionsImplantation of dual-chamber permanent pacemaker (Dr. Caballero)    Dressing:  Clean, dry, and intact  Chest XR reviewed:  Yes  Pneumo present?:  No  Lead Measurements per Device Rep:  WNL    Education Provided:  - Avoid raising left arm above the level of the shoulder for 2 weeks.(until 5/21/25)  - No lifting >10 pounds for the first week   - For strenuous sports such as tennis, pickle ball, basketball, golf, or weight lifting wait 4 weeks to ensure stable lead healing.   - If there is a pressure dressing in place, this can be removed after 24 hours.   - Leave Aquacel dressing in place.  Okay to shower the day after the procedure.  If this begins to peel up, contact the device clinic.    - Aquacel dressing and steri-strips will be removed at 1 week device check, as appropriate  - Limit driving for: 1 week (PPM)  - Watch for redness, drainage, warmth, or fever. Call device clinic if any signs of infection.   - No soaking in bath tub, swimming pool or hot tub until you are cleared at your 6-8 week check  - Call Device Clinic with increased swelling, drainage, or fever > 101 degrees.     - Follow-up with the Device Clinic as scheduled.     Pt verbalized understanding of instructions and AVS updated.

## 2025-05-19 ENCOUNTER — TELEPHONE (OUTPATIENT)
Dept: CARDIOLOGY | Facility: CLINIC | Age: 80
End: 2025-05-19
Payer: COMMERCIAL

## 2025-05-19 NOTE — TELEPHONE ENCOUNTER
Received message from patient's son, Beatriz, who stated he removed the Aquacel dressing over patient's PPM site.  He stated it looks like the clear bandage part of the dressing came off and is still over the patient's incision.  He is wondering if he should take this off.     Dr. Caballero performed patient's PPM procedure.  Called and left message explaining that this is a surgical adhesive, or dermabond, over the incision area.  Instructed them to leave this in place.  Okay to shower with no scrubbing over the site.  Requested call back to review any questions.  Patient is scheduled for device follow-up on 5/21/25.     KIKO Awan

## 2025-05-21 ENCOUNTER — ANCILLARY PROCEDURE (OUTPATIENT)
Dept: CARDIOLOGY | Facility: CLINIC | Age: 80
End: 2025-05-21
Attending: INTERNAL MEDICINE
Payer: COMMERCIAL

## 2025-05-21 DIAGNOSIS — Z95.0 CARDIAC PACEMAKER IN SITU: ICD-10-CM

## 2025-05-21 LAB
MDC_IDC_LEAD_CONNECTION_STATUS: NORMAL
MDC_IDC_LEAD_CONNECTION_STATUS: NORMAL
MDC_IDC_LEAD_IMPLANT_DT: NORMAL
MDC_IDC_LEAD_IMPLANT_DT: NORMAL
MDC_IDC_LEAD_LOCATION: NORMAL
MDC_IDC_LEAD_LOCATION: NORMAL
MDC_IDC_LEAD_LOCATION_DETAIL_1: NORMAL
MDC_IDC_LEAD_LOCATION_DETAIL_1: NORMAL
MDC_IDC_LEAD_MFG: NORMAL
MDC_IDC_LEAD_MFG: NORMAL
MDC_IDC_LEAD_MODEL: NORMAL
MDC_IDC_LEAD_MODEL: NORMAL
MDC_IDC_LEAD_POLARITY_TYPE: NORMAL
MDC_IDC_LEAD_POLARITY_TYPE: NORMAL
MDC_IDC_LEAD_SERIAL: NORMAL
MDC_IDC_LEAD_SERIAL: NORMAL
MDC_IDC_LEAD_SPECIAL_FUNCTION: NORMAL
MDC_IDC_MSMT_BATTERY_DTM: NORMAL
MDC_IDC_MSMT_BATTERY_REMAINING_LONGEVITY: 180 MO
MDC_IDC_MSMT_BATTERY_RRT_TRIGGER: 2.62
MDC_IDC_MSMT_BATTERY_STATUS: NORMAL
MDC_IDC_MSMT_BATTERY_VOLTAGE: 3.23 V
MDC_IDC_MSMT_LEADCHNL_RA_IMPEDANCE_VALUE: 361 OHM
MDC_IDC_MSMT_LEADCHNL_RA_IMPEDANCE_VALUE: 513 OHM
MDC_IDC_MSMT_LEADCHNL_RA_PACING_THRESHOLD_AMPLITUDE: 0.62 V
MDC_IDC_MSMT_LEADCHNL_RA_PACING_THRESHOLD_PULSEWIDTH: 0.4 MS
MDC_IDC_MSMT_LEADCHNL_RA_SENSING_INTR_AMPL: 2.5 MV
MDC_IDC_MSMT_LEADCHNL_RA_SENSING_INTR_AMPL: 2.62 MV
MDC_IDC_MSMT_LEADCHNL_RV_IMPEDANCE_VALUE: 399 OHM
MDC_IDC_MSMT_LEADCHNL_RV_IMPEDANCE_VALUE: 703 OHM
MDC_IDC_MSMT_LEADCHNL_RV_PACING_THRESHOLD_AMPLITUDE: 1.25 V
MDC_IDC_MSMT_LEADCHNL_RV_PACING_THRESHOLD_PULSEWIDTH: 0.4 MS
MDC_IDC_MSMT_LEADCHNL_RV_SENSING_INTR_AMPL: 7.38 MV
MDC_IDC_MSMT_LEADCHNL_RV_SENSING_INTR_AMPL: 7.38 MV
MDC_IDC_PG_IMPLANT_DTM: NORMAL
MDC_IDC_PG_MFG: NORMAL
MDC_IDC_PG_MODEL: NORMAL
MDC_IDC_PG_SERIAL: NORMAL
MDC_IDC_PG_TYPE: NORMAL
MDC_IDC_SESS_CLINIC_NAME: NORMAL
MDC_IDC_SESS_DTM: NORMAL
MDC_IDC_SESS_TYPE: NORMAL
MDC_IDC_SET_BRADY_AT_MODE_SWITCH_RATE: 171 {BEATS}/MIN
MDC_IDC_SET_BRADY_HYSTRATE: NORMAL
MDC_IDC_SET_BRADY_LOWRATE: 60 {BEATS}/MIN
MDC_IDC_SET_BRADY_MAX_SENSOR_RATE: 130 {BEATS}/MIN
MDC_IDC_SET_BRADY_MAX_TRACKING_RATE: 130 {BEATS}/MIN
MDC_IDC_SET_BRADY_MODE: NORMAL
MDC_IDC_SET_BRADY_PAV_DELAY_LOW: 180 MS
MDC_IDC_SET_BRADY_SAV_DELAY_LOW: 150 MS
MDC_IDC_SET_LEADCHNL_RA_PACING_AMPLITUDE: 1.5 V
MDC_IDC_SET_LEADCHNL_RA_PACING_ANODE_ELECTRODE_1: NORMAL
MDC_IDC_SET_LEADCHNL_RA_PACING_ANODE_LOCATION_1: NORMAL
MDC_IDC_SET_LEADCHNL_RA_PACING_CAPTURE_MODE: NORMAL
MDC_IDC_SET_LEADCHNL_RA_PACING_CATHODE_ELECTRODE_1: NORMAL
MDC_IDC_SET_LEADCHNL_RA_PACING_CATHODE_LOCATION_1: NORMAL
MDC_IDC_SET_LEADCHNL_RA_PACING_POLARITY: NORMAL
MDC_IDC_SET_LEADCHNL_RA_PACING_PULSEWIDTH: 0.4 MS
MDC_IDC_SET_LEADCHNL_RA_SENSING_ANODE_ELECTRODE_1: NORMAL
MDC_IDC_SET_LEADCHNL_RA_SENSING_ANODE_LOCATION_1: NORMAL
MDC_IDC_SET_LEADCHNL_RA_SENSING_CATHODE_ELECTRODE_1: NORMAL
MDC_IDC_SET_LEADCHNL_RA_SENSING_CATHODE_LOCATION_1: NORMAL
MDC_IDC_SET_LEADCHNL_RA_SENSING_POLARITY: NORMAL
MDC_IDC_SET_LEADCHNL_RA_SENSING_SENSITIVITY: 0.3 MV
MDC_IDC_SET_LEADCHNL_RV_PACING_AMPLITUDE: 2.5 V
MDC_IDC_SET_LEADCHNL_RV_PACING_ANODE_ELECTRODE_1: NORMAL
MDC_IDC_SET_LEADCHNL_RV_PACING_ANODE_LOCATION_1: NORMAL
MDC_IDC_SET_LEADCHNL_RV_PACING_CAPTURE_MODE: NORMAL
MDC_IDC_SET_LEADCHNL_RV_PACING_CATHODE_ELECTRODE_1: NORMAL
MDC_IDC_SET_LEADCHNL_RV_PACING_CATHODE_LOCATION_1: NORMAL
MDC_IDC_SET_LEADCHNL_RV_PACING_POLARITY: NORMAL
MDC_IDC_SET_LEADCHNL_RV_PACING_PULSEWIDTH: 0.4 MS
MDC_IDC_SET_LEADCHNL_RV_SENSING_ANODE_ELECTRODE_1: NORMAL
MDC_IDC_SET_LEADCHNL_RV_SENSING_ANODE_LOCATION_1: NORMAL
MDC_IDC_SET_LEADCHNL_RV_SENSING_CATHODE_ELECTRODE_1: NORMAL
MDC_IDC_SET_LEADCHNL_RV_SENSING_CATHODE_LOCATION_1: NORMAL
MDC_IDC_SET_LEADCHNL_RV_SENSING_POLARITY: NORMAL
MDC_IDC_SET_LEADCHNL_RV_SENSING_SENSITIVITY: 0.9 MV
MDC_IDC_SET_ZONE_DETECTION_INTERVAL: 350 MS
MDC_IDC_SET_ZONE_DETECTION_INTERVAL: 400 MS
MDC_IDC_SET_ZONE_STATUS: NORMAL
MDC_IDC_SET_ZONE_STATUS: NORMAL
MDC_IDC_SET_ZONE_TYPE: NORMAL
MDC_IDC_SET_ZONE_VENDOR_TYPE: NORMAL
MDC_IDC_STAT_AT_BURDEN_PERCENT: 0 %
MDC_IDC_STAT_AT_DTM_END: NORMAL
MDC_IDC_STAT_AT_DTM_START: NORMAL
MDC_IDC_STAT_BRADY_AP_VP_PERCENT: 0 %
MDC_IDC_STAT_BRADY_AP_VS_PERCENT: 22.3 %
MDC_IDC_STAT_BRADY_AS_VP_PERCENT: 0.02 %
MDC_IDC_STAT_BRADY_AS_VS_PERCENT: 77.67 %
MDC_IDC_STAT_BRADY_DTM_END: NORMAL
MDC_IDC_STAT_BRADY_DTM_START: NORMAL
MDC_IDC_STAT_BRADY_RA_PERCENT_PACED: 22.24 %
MDC_IDC_STAT_BRADY_RV_PERCENT_PACED: 0.03 %
MDC_IDC_STAT_EPISODE_RECENT_COUNT: 0
MDC_IDC_STAT_EPISODE_RECENT_COUNT_DTM_END: NORMAL
MDC_IDC_STAT_EPISODE_RECENT_COUNT_DTM_START: NORMAL
MDC_IDC_STAT_EPISODE_TOTAL_COUNT: 0
MDC_IDC_STAT_EPISODE_TOTAL_COUNT_DTM_END: NORMAL
MDC_IDC_STAT_EPISODE_TOTAL_COUNT_DTM_START: NORMAL
MDC_IDC_STAT_EPISODE_TYPE: NORMAL
MDC_IDC_STAT_TACHYTHERAPY_RECENT_DTM_END: NORMAL
MDC_IDC_STAT_TACHYTHERAPY_RECENT_DTM_START: NORMAL
MDC_IDC_STAT_TACHYTHERAPY_TOTAL_DTM_END: NORMAL
MDC_IDC_STAT_TACHYTHERAPY_TOTAL_DTM_START: NORMAL

## 2025-05-21 PROCEDURE — 93280 PM DEVICE PROGR EVAL DUAL: CPT | Performed by: INTERNAL MEDICINE

## 2025-07-14 ENCOUNTER — ANCILLARY PROCEDURE (OUTPATIENT)
Dept: CARDIOLOGY | Facility: CLINIC | Age: 80
End: 2025-07-14
Attending: INTERNAL MEDICINE
Payer: COMMERCIAL

## 2025-07-14 DIAGNOSIS — Z95.0 CARDIAC PACEMAKER IN SITU: ICD-10-CM

## 2025-07-14 PROCEDURE — 93280 PM DEVICE PROGR EVAL DUAL: CPT | Performed by: INTERNAL MEDICINE

## 2025-07-15 LAB
MDC_IDC_LEAD_CONNECTION_STATUS: NORMAL
MDC_IDC_LEAD_CONNECTION_STATUS: NORMAL
MDC_IDC_LEAD_IMPLANT_DT: NORMAL
MDC_IDC_LEAD_IMPLANT_DT: NORMAL
MDC_IDC_LEAD_LOCATION: NORMAL
MDC_IDC_LEAD_LOCATION: NORMAL
MDC_IDC_LEAD_LOCATION_DETAIL_1: NORMAL
MDC_IDC_LEAD_LOCATION_DETAIL_1: NORMAL
MDC_IDC_LEAD_MFG: NORMAL
MDC_IDC_LEAD_MFG: NORMAL
MDC_IDC_LEAD_MODEL: NORMAL
MDC_IDC_LEAD_MODEL: NORMAL
MDC_IDC_LEAD_POLARITY_TYPE: NORMAL
MDC_IDC_LEAD_POLARITY_TYPE: NORMAL
MDC_IDC_LEAD_SERIAL: NORMAL
MDC_IDC_LEAD_SERIAL: NORMAL
MDC_IDC_LEAD_SPECIAL_FUNCTION: NORMAL
MDC_IDC_MSMT_BATTERY_DTM: NORMAL
MDC_IDC_MSMT_BATTERY_REMAINING_LONGEVITY: 180 MO
MDC_IDC_MSMT_BATTERY_RRT_TRIGGER: 2.62
MDC_IDC_MSMT_BATTERY_STATUS: NORMAL
MDC_IDC_MSMT_BATTERY_VOLTAGE: 3.23 V
MDC_IDC_MSMT_LEADCHNL_RA_IMPEDANCE_VALUE: 399 OHM
MDC_IDC_MSMT_LEADCHNL_RA_IMPEDANCE_VALUE: 532 OHM
MDC_IDC_MSMT_LEADCHNL_RA_PACING_THRESHOLD_AMPLITUDE: 0.62 V
MDC_IDC_MSMT_LEADCHNL_RA_PACING_THRESHOLD_PULSEWIDTH: 0.4 MS
MDC_IDC_MSMT_LEADCHNL_RA_SENSING_INTR_AMPL: 2.75 MV
MDC_IDC_MSMT_LEADCHNL_RA_SENSING_INTR_AMPL: 3.38 MV
MDC_IDC_MSMT_LEADCHNL_RV_IMPEDANCE_VALUE: 418 OHM
MDC_IDC_MSMT_LEADCHNL_RV_IMPEDANCE_VALUE: 779 OHM
MDC_IDC_MSMT_LEADCHNL_RV_PACING_THRESHOLD_AMPLITUDE: 1.62 V
MDC_IDC_MSMT_LEADCHNL_RV_PACING_THRESHOLD_PULSEWIDTH: 0.4 MS
MDC_IDC_MSMT_LEADCHNL_RV_SENSING_INTR_AMPL: 6.75 MV
MDC_IDC_MSMT_LEADCHNL_RV_SENSING_INTR_AMPL: 7.62 MV
MDC_IDC_PG_IMPLANT_DTM: NORMAL
MDC_IDC_PG_MFG: NORMAL
MDC_IDC_PG_MODEL: NORMAL
MDC_IDC_PG_SERIAL: NORMAL
MDC_IDC_PG_TYPE: NORMAL
MDC_IDC_SESS_CLINIC_NAME: NORMAL
MDC_IDC_SESS_DTM: NORMAL
MDC_IDC_SESS_TYPE: NORMAL
MDC_IDC_SET_BRADY_AT_MODE_SWITCH_RATE: 171 {BEATS}/MIN
MDC_IDC_SET_BRADY_HYSTRATE: NORMAL
MDC_IDC_SET_BRADY_LOWRATE: 60 {BEATS}/MIN
MDC_IDC_SET_BRADY_MAX_SENSOR_RATE: 130 {BEATS}/MIN
MDC_IDC_SET_BRADY_MAX_TRACKING_RATE: 130 {BEATS}/MIN
MDC_IDC_SET_BRADY_MODE: NORMAL
MDC_IDC_SET_BRADY_PAV_DELAY_LOW: 180 MS
MDC_IDC_SET_BRADY_SAV_DELAY_LOW: 150 MS
MDC_IDC_SET_LEADCHNL_RA_PACING_AMPLITUDE: 1.5 V
MDC_IDC_SET_LEADCHNL_RA_PACING_ANODE_ELECTRODE_1: NORMAL
MDC_IDC_SET_LEADCHNL_RA_PACING_ANODE_LOCATION_1: NORMAL
MDC_IDC_SET_LEADCHNL_RA_PACING_CAPTURE_MODE: NORMAL
MDC_IDC_SET_LEADCHNL_RA_PACING_CATHODE_ELECTRODE_1: NORMAL
MDC_IDC_SET_LEADCHNL_RA_PACING_CATHODE_LOCATION_1: NORMAL
MDC_IDC_SET_LEADCHNL_RA_PACING_POLARITY: NORMAL
MDC_IDC_SET_LEADCHNL_RA_PACING_PULSEWIDTH: 0.4 MS
MDC_IDC_SET_LEADCHNL_RA_SENSING_ANODE_ELECTRODE_1: NORMAL
MDC_IDC_SET_LEADCHNL_RA_SENSING_ANODE_LOCATION_1: NORMAL
MDC_IDC_SET_LEADCHNL_RA_SENSING_CATHODE_ELECTRODE_1: NORMAL
MDC_IDC_SET_LEADCHNL_RA_SENSING_CATHODE_LOCATION_1: NORMAL
MDC_IDC_SET_LEADCHNL_RA_SENSING_POLARITY: NORMAL
MDC_IDC_SET_LEADCHNL_RA_SENSING_SENSITIVITY: 0.3 MV
MDC_IDC_SET_LEADCHNL_RV_PACING_AMPLITUDE: 3.25 V
MDC_IDC_SET_LEADCHNL_RV_PACING_ANODE_ELECTRODE_1: NORMAL
MDC_IDC_SET_LEADCHNL_RV_PACING_ANODE_LOCATION_1: NORMAL
MDC_IDC_SET_LEADCHNL_RV_PACING_CAPTURE_MODE: NORMAL
MDC_IDC_SET_LEADCHNL_RV_PACING_CATHODE_ELECTRODE_1: NORMAL
MDC_IDC_SET_LEADCHNL_RV_PACING_CATHODE_LOCATION_1: NORMAL
MDC_IDC_SET_LEADCHNL_RV_PACING_POLARITY: NORMAL
MDC_IDC_SET_LEADCHNL_RV_PACING_PULSEWIDTH: 0.4 MS
MDC_IDC_SET_LEADCHNL_RV_SENSING_ANODE_ELECTRODE_1: NORMAL
MDC_IDC_SET_LEADCHNL_RV_SENSING_ANODE_LOCATION_1: NORMAL
MDC_IDC_SET_LEADCHNL_RV_SENSING_CATHODE_ELECTRODE_1: NORMAL
MDC_IDC_SET_LEADCHNL_RV_SENSING_CATHODE_LOCATION_1: NORMAL
MDC_IDC_SET_LEADCHNL_RV_SENSING_POLARITY: NORMAL
MDC_IDC_SET_LEADCHNL_RV_SENSING_SENSITIVITY: 0.9 MV
MDC_IDC_SET_ZONE_DETECTION_INTERVAL: 350 MS
MDC_IDC_SET_ZONE_DETECTION_INTERVAL: 400 MS
MDC_IDC_SET_ZONE_STATUS: NORMAL
MDC_IDC_SET_ZONE_STATUS: NORMAL
MDC_IDC_SET_ZONE_TYPE: NORMAL
MDC_IDC_SET_ZONE_VENDOR_TYPE: NORMAL
MDC_IDC_STAT_AT_BURDEN_PERCENT: 0 %
MDC_IDC_STAT_AT_DTM_END: NORMAL
MDC_IDC_STAT_AT_DTM_START: NORMAL
MDC_IDC_STAT_BRADY_AP_VP_PERCENT: 0.01 %
MDC_IDC_STAT_BRADY_AP_VS_PERCENT: 18.68 %
MDC_IDC_STAT_BRADY_AS_VP_PERCENT: 0.03 %
MDC_IDC_STAT_BRADY_AS_VS_PERCENT: 81.28 %
MDC_IDC_STAT_BRADY_DTM_END: NORMAL
MDC_IDC_STAT_BRADY_DTM_START: NORMAL
MDC_IDC_STAT_BRADY_RA_PERCENT_PACED: 18.62 %
MDC_IDC_STAT_BRADY_RV_PERCENT_PACED: 0.04 %
MDC_IDC_STAT_EPISODE_RECENT_COUNT: 0
MDC_IDC_STAT_EPISODE_RECENT_COUNT_DTM_END: NORMAL
MDC_IDC_STAT_EPISODE_RECENT_COUNT_DTM_START: NORMAL
MDC_IDC_STAT_EPISODE_TOTAL_COUNT: 0
MDC_IDC_STAT_EPISODE_TOTAL_COUNT_DTM_END: NORMAL
MDC_IDC_STAT_EPISODE_TOTAL_COUNT_DTM_START: NORMAL
MDC_IDC_STAT_EPISODE_TYPE: NORMAL
MDC_IDC_STAT_TACHYTHERAPY_RECENT_DTM_END: NORMAL
MDC_IDC_STAT_TACHYTHERAPY_RECENT_DTM_START: NORMAL
MDC_IDC_STAT_TACHYTHERAPY_TOTAL_DTM_END: NORMAL
MDC_IDC_STAT_TACHYTHERAPY_TOTAL_DTM_START: NORMAL

## (undated) DEVICE — DEFIB PRO-PADZ LVP LQD GEL ADULT 8900-2105-01

## (undated) DEVICE — PACK PCMKR PERM SRG PROC LF SAN32PC573

## (undated) DEVICE — SLITTER ADJSTBL 6232ADJ

## (undated) DEVICE — CABLE PACING ALLIGATOR CLIP 12FT 5833SL

## (undated) DEVICE — SHEATH PRELUDE SNAP 7FRX13CM PLS-1007

## (undated) DEVICE — RAD INTRODUCER KIT MICRO 5FRX10CM .018 NITINOL G/W

## (undated) DEVICE — GUIDEWIRE VASC 0.035INX150CM INQWIRE J TIP IQ35F150J3F/A

## (undated) DEVICE — CATH GD 5.4FR ID / 7FR OD X 43

## (undated) RX ORDER — CEFAZOLIN SODIUM 2 G/50ML
SOLUTION INTRAVENOUS
Status: DISPENSED
Start: 2025-05-07

## (undated) RX ORDER — FENTANYL CITRATE 50 UG/ML
INJECTION, SOLUTION INTRAMUSCULAR; INTRAVENOUS
Status: DISPENSED
Start: 2025-05-07

## (undated) RX ORDER — LIDOCAINE HYDROCHLORIDE 10 MG/ML
INJECTION, SOLUTION EPIDURAL; INFILTRATION; INTRACAUDAL; PERINEURAL
Status: DISPENSED
Start: 2025-05-07

## (undated) RX ORDER — BUPIVACAINE HYDROCHLORIDE 2.5 MG/ML
INJECTION, SOLUTION EPIDURAL; INFILTRATION; INTRACAUDAL; PERINEURAL
Status: DISPENSED
Start: 2025-05-07